# Patient Record
Sex: FEMALE | Race: WHITE | NOT HISPANIC OR LATINO | Employment: FULL TIME | URBAN - METROPOLITAN AREA
[De-identification: names, ages, dates, MRNs, and addresses within clinical notes are randomized per-mention and may not be internally consistent; named-entity substitution may affect disease eponyms.]

---

## 2017-07-26 ENCOUNTER — PRIOR ORIGINAL RECORDS (OUTPATIENT)
Dept: HEALTH INFORMATION MANAGEMENT | Age: 47
End: 2017-07-26

## 2017-10-02 ENCOUNTER — OFF PREMISE (OUTPATIENT)
Dept: OTHER | Age: 47
End: 2017-10-02

## 2017-10-17 ENCOUNTER — LAB SERVICES (OUTPATIENT)
Dept: LAB | Age: 47
End: 2017-10-17

## 2017-10-17 ENCOUNTER — OFFICE VISIT (OUTPATIENT)
Dept: OBGYN | Age: 47
End: 2017-10-17

## 2017-10-17 VITALS
SYSTOLIC BLOOD PRESSURE: 141 MMHG | DIASTOLIC BLOOD PRESSURE: 87 MMHG | BODY MASS INDEX: 30.24 KG/M2 | HEART RATE: 73 BPM | HEIGHT: 62 IN | WEIGHT: 164.35 LBS

## 2017-10-17 DIAGNOSIS — Z01.419 ENCOUNTER FOR GYNECOLOGICAL EXAMINATION WITHOUT ABNORMAL FINDING: Primary | ICD-10-CM

## 2017-10-17 DIAGNOSIS — Z01.419 ENCOUNTER FOR GYNECOLOGICAL EXAMINATION WITHOUT ABNORMAL FINDING: ICD-10-CM

## 2017-10-17 LAB
CHOLEST SERPL-MCNC: 206 MG/DL
CHOLEST/HDLC SERPL: 6.6 {RATIO}
FASTING STATUS PATIENT QL REPORTED: 15 HRS
GLUCOSE SERPL-MCNC: 87 MG/DL (ref 65–99)
HDLC SERPL-MCNC: 31 MG/DL
LDLC SERPL-MCNC: 139 MG/DL
LENGTH OF FAST TIME PATIENT: 15 HRS
NONHDLC SERPL-MCNC: 175 MG/DL
TRIGL SERPL-MCNC: 181 MG/DL

## 2017-10-17 PROCEDURE — 87624 HPV HI-RISK TYP POOLED RSLT: CPT | Performed by: INTERNAL MEDICINE

## 2017-10-17 PROCEDURE — 36415 COLL VENOUS BLD VENIPUNCTURE: CPT | Performed by: INTERNAL MEDICINE

## 2017-10-17 PROCEDURE — 80061 LIPID PANEL: CPT | Performed by: INTERNAL MEDICINE

## 2017-10-17 PROCEDURE — 99396 PREV VISIT EST AGE 40-64: CPT | Performed by: OBSTETRICS & GYNECOLOGY

## 2017-10-17 PROCEDURE — 82947 ASSAY GLUCOSE BLOOD QUANT: CPT | Performed by: INTERNAL MEDICINE

## 2017-10-17 PROCEDURE — 88175 CYTOPATH C/V AUTO FLUID REDO: CPT | Performed by: PATHOLOGY

## 2017-10-17 RX ORDER — LEVONORGESTREL AND ETHINYL ESTRADIOL 0.15-0.03
1 KIT ORAL DAILY
Qty: 91 TABLET | Refills: 4 | Status: SHIPPED | OUTPATIENT
Start: 2017-10-17 | End: 2018-10-26 | Stop reason: SDUPTHER

## 2017-10-20 LAB — HPV16+18+45 E6+E7MRNA CVX NAA+PROBE: NORMAL

## 2017-10-23 ENCOUNTER — TELEPHONE (OUTPATIENT)
Dept: OBGYN | Age: 47
End: 2017-10-23

## 2018-01-01 ENCOUNTER — EXTERNAL RECORD (OUTPATIENT)
Dept: OTHER | Age: 48
End: 2018-01-01

## 2018-10-01 ENCOUNTER — TELEPHONE (OUTPATIENT)
Dept: OBGYN | Age: 48
End: 2018-10-01

## 2018-10-26 RX ORDER — LEVONORGESTREL AND ETHINYL ESTRADIOL 0.15-0.03
KIT ORAL
Qty: 91 TABLET | Refills: 0 | Status: SHIPPED | OUTPATIENT
Start: 2018-10-26 | End: 2018-11-09 | Stop reason: SDUPTHER

## 2018-11-09 ENCOUNTER — LAB SERVICES (OUTPATIENT)
Dept: LAB | Age: 48
End: 2018-11-09

## 2018-11-09 ENCOUNTER — OFFICE VISIT (OUTPATIENT)
Dept: OBGYN | Age: 48
End: 2018-11-09

## 2018-11-09 VITALS
DIASTOLIC BLOOD PRESSURE: 86 MMHG | HEIGHT: 61 IN | BODY MASS INDEX: 31.47 KG/M2 | SYSTOLIC BLOOD PRESSURE: 138 MMHG | WEIGHT: 166.67 LBS | HEART RATE: 82 BPM

## 2018-11-09 DIAGNOSIS — Z11.3 SCREEN FOR STD (SEXUALLY TRANSMITTED DISEASE): ICD-10-CM

## 2018-11-09 DIAGNOSIS — Z01.419 WELL WOMAN EXAM WITH ROUTINE GYNECOLOGICAL EXAM: ICD-10-CM

## 2018-11-09 DIAGNOSIS — Z01.419 WELL WOMAN EXAM WITH ROUTINE GYNECOLOGICAL EXAM: Primary | ICD-10-CM

## 2018-11-09 PROCEDURE — 82947 ASSAY GLUCOSE BLOOD QUANT: CPT | Performed by: INTERNAL MEDICINE

## 2018-11-09 PROCEDURE — 87591 N.GONORRHOEAE DNA AMP PROB: CPT | Performed by: INTERNAL MEDICINE

## 2018-11-09 PROCEDURE — 99396 PREV VISIT EST AGE 40-64: CPT | Performed by: OBSTETRICS & GYNECOLOGY

## 2018-11-09 PROCEDURE — 80061 LIPID PANEL: CPT | Performed by: INTERNAL MEDICINE

## 2018-11-09 PROCEDURE — 87491 CHLMYD TRACH DNA AMP PROBE: CPT | Performed by: INTERNAL MEDICINE

## 2018-11-09 PROCEDURE — 87389 HIV-1 AG W/HIV-1&-2 AB AG IA: CPT | Performed by: INTERNAL MEDICINE

## 2018-11-09 PROCEDURE — 86780 TREPONEMA PALLIDUM: CPT | Performed by: INTERNAL MEDICINE

## 2018-11-09 PROCEDURE — 36415 COLL VENOUS BLD VENIPUNCTURE: CPT | Performed by: INTERNAL MEDICINE

## 2018-11-09 PROCEDURE — 87340 HEPATITIS B SURFACE AG IA: CPT | Performed by: INTERNAL MEDICINE

## 2018-11-09 PROCEDURE — 86803 HEPATITIS C AB TEST: CPT | Performed by: INTERNAL MEDICINE

## 2018-11-09 RX ORDER — LEVONORGESTREL AND ETHINYL ESTRADIOL 0.15-0.03
1 KIT ORAL DAILY
Qty: 91 TABLET | Refills: 4 | Status: SHIPPED | OUTPATIENT
Start: 2018-11-09 | End: 2020-11-23 | Stop reason: ALTCHOICE

## 2018-11-10 LAB
CHOLEST SERPL-MCNC: 200 MG/DL
CHOLEST/HDLC SERPL: 4.9 {RATIO}
FASTING STATUS PATIENT QL REPORTED: 13 HRS
GLUCOSE SERPL-MCNC: 85 MG/DL (ref 65–99)
HBV SURFACE AG SER QL: NEGATIVE
HCV AB SER QL: NEGATIVE
HDLC SERPL-MCNC: 41 MG/DL
HIV 1+2 AB+HIV1 P24 AG SERPL QL IA: NONREACTIVE
LDLC SERPL-MCNC: 125 MG/DL
LENGTH OF FAST TIME PATIENT: 13 HRS
NONHDLC SERPL-MCNC: 159 MG/DL
T PALLIDUM IGG SER QL: NONREACTIVE
TRIGL SERPL-MCNC: 169 MG/DL

## 2018-11-12 LAB
C TRACH RRNA SPEC QL NAA+PROBE: NEGATIVE
N GONORRHOEA RRNA SPEC QL NAA+PROBE: NEGATIVE
SPECIMEN SOURCE: NORMAL

## 2018-11-13 ENCOUNTER — TELEPHONE (OUTPATIENT)
Dept: OBGYN | Age: 48
End: 2018-11-13

## 2019-01-01 ENCOUNTER — EXTERNAL RECORD (OUTPATIENT)
Dept: OTHER | Age: 49
End: 2019-01-01

## 2019-07-12 RX ORDER — LEVONORGESTREL AND ETHINYL ESTRADIOL 0.15-0.03
KIT ORAL
Qty: 91 TABLET | Refills: 3 | Status: SHIPPED | OUTPATIENT
Start: 2019-07-12 | End: 2019-11-21 | Stop reason: SDUPTHER

## 2019-11-21 ENCOUNTER — OFFICE VISIT (OUTPATIENT)
Dept: OBGYN | Age: 49
End: 2019-11-21

## 2019-11-21 ENCOUNTER — LAB SERVICES (OUTPATIENT)
Dept: LAB | Age: 49
End: 2019-11-21

## 2019-11-21 VITALS
HEIGHT: 62 IN | HEART RATE: 81 BPM | SYSTOLIC BLOOD PRESSURE: 136 MMHG | DIASTOLIC BLOOD PRESSURE: 88 MMHG | BODY MASS INDEX: 31.56 KG/M2 | WEIGHT: 171.5 LBS

## 2019-11-21 DIAGNOSIS — Z11.3 SCREEN FOR STD (SEXUALLY TRANSMITTED DISEASE): ICD-10-CM

## 2019-11-21 DIAGNOSIS — N95.1 PERIMENOPAUSAL: ICD-10-CM

## 2019-11-21 DIAGNOSIS — Z01.419 WELL WOMAN EXAM WITH ROUTINE GYNECOLOGICAL EXAM: Primary | ICD-10-CM

## 2019-11-21 LAB — FSH SERPL-ACNC: 0.9 MUNITS/ML

## 2019-11-21 PROCEDURE — 87491 CHLMYD TRACH DNA AMP PROBE: CPT | Performed by: INTERNAL MEDICINE

## 2019-11-21 PROCEDURE — 87340 HEPATITIS B SURFACE AG IA: CPT | Performed by: INTERNAL MEDICINE

## 2019-11-21 PROCEDURE — 83001 ASSAY OF GONADOTROPIN (FSH): CPT | Performed by: INTERNAL MEDICINE

## 2019-11-21 PROCEDURE — 87389 HIV-1 AG W/HIV-1&-2 AB AG IA: CPT | Performed by: INTERNAL MEDICINE

## 2019-11-21 PROCEDURE — 99396 PREV VISIT EST AGE 40-64: CPT | Performed by: OBSTETRICS & GYNECOLOGY

## 2019-11-21 PROCEDURE — 86592 SYPHILIS TEST NON-TREP QUAL: CPT | Performed by: INTERNAL MEDICINE

## 2019-11-21 PROCEDURE — 36415 COLL VENOUS BLD VENIPUNCTURE: CPT | Performed by: INTERNAL MEDICINE

## 2019-11-21 PROCEDURE — 86803 HEPATITIS C AB TEST: CPT | Performed by: INTERNAL MEDICINE

## 2019-11-21 PROCEDURE — 87591 N.GONORRHOEAE DNA AMP PROB: CPT | Performed by: INTERNAL MEDICINE

## 2019-11-21 RX ORDER — LEVONORGESTREL AND ETHINYL ESTRADIOL 0.15-0.03
1 KIT ORAL DAILY
Qty: 91 TABLET | Refills: 4 | Status: SHIPPED | OUTPATIENT
Start: 2019-11-21 | End: 2020-11-23 | Stop reason: ALTCHOICE

## 2019-11-22 ENCOUNTER — TELEPHONE (OUTPATIENT)
Dept: OBGYN | Age: 49
End: 2019-11-22

## 2019-11-22 LAB
C TRACH RRNA SPEC QL NAA+PROBE: NEGATIVE
HBV SURFACE AG SER QL: NEGATIVE
HCV AB SER QL: NEGATIVE
HIV 1+2 AB+HIV1 P24 AG SERPL QL IA: NONREACTIVE
N GONORRHOEA RRNA SPEC QL NAA+PROBE: NEGATIVE
RPR SER QL: NONREACTIVE
SPECIMEN SOURCE: NORMAL

## 2019-12-19 ENCOUNTER — HOSPITAL ENCOUNTER (EMERGENCY)
Age: 49
Discharge: HOME | End: 2019-12-19
Attending: EMERGENCY MEDICINE

## 2019-12-19 VITALS
WEIGHT: 172.8 LBS | TEMPERATURE: 98.9 F | OXYGEN SATURATION: 100 % | DIASTOLIC BLOOD PRESSURE: 96 MMHG | SYSTOLIC BLOOD PRESSURE: 172 MMHG | BODY MASS INDEX: 31.8 KG/M2 | RESPIRATION RATE: 16 BRPM | HEIGHT: 62 IN | HEART RATE: 83 BPM

## 2019-12-19 DIAGNOSIS — B02.8 HERPES ZOSTER WITH COMPLICATION: Primary | ICD-10-CM

## 2019-12-19 PROCEDURE — 99283 EMERGENCY DEPT VISIT LOW MDM: CPT

## 2019-12-19 RX ORDER — VALACYCLOVIR HYDROCHLORIDE 1 G/1
1000 TABLET, FILM COATED ORAL 3 TIMES DAILY
Qty: 21 TABLET | Refills: 0 | Status: SHIPPED | OUTPATIENT
Start: 2019-12-19 | End: 2020-11-23 | Stop reason: ALTCHOICE

## 2019-12-19 ASSESSMENT — ENCOUNTER SYMPTOMS
VOMITING: 0
CONFUSION: 0
WEAKNESS: 0
NAUSEA: 0
FEVER: 0
COUGH: 0
SORE THROAT: 0
EYE PAIN: 0
COLOR CHANGE: 0
DIARRHEA: 0
PHOTOPHOBIA: 0
FATIGUE: 0
SHORTNESS OF BREATH: 0
ABDOMINAL PAIN: 0
BACK PAIN: 0
CHILLS: 0
HEADACHES: 0
NUMBNESS: 0
APPETITE CHANGE: 0

## 2019-12-19 ASSESSMENT — PAIN DESCRIPTION - PAIN TYPE: TYPE: ACUTE PAIN

## 2019-12-19 ASSESSMENT — PAIN SCALES - GENERAL: PAINLEVEL_OUTOF10: 2

## 2020-01-01 ENCOUNTER — EXTERNAL RECORD (OUTPATIENT)
Dept: OTHER | Age: 50
End: 2020-01-01

## 2020-02-17 ENCOUNTER — TELEPHONE (OUTPATIENT)
Dept: OBGYN | Age: 50
End: 2020-02-17

## 2020-05-07 ENCOUNTER — LAB SERVICES (OUTPATIENT)
Dept: LAB | Age: 50
End: 2020-05-07

## 2020-05-07 ENCOUNTER — TELEPHONE (OUTPATIENT)
Dept: OBGYN | Age: 50
End: 2020-05-07

## 2020-05-07 DIAGNOSIS — N95.1 PERIMENOPAUSAL: ICD-10-CM

## 2020-05-07 LAB — FSH SERPL-ACNC: 120.8 MUNITS/ML

## 2020-05-07 PROCEDURE — 83001 ASSAY OF GONADOTROPIN (FSH): CPT | Performed by: INTERNAL MEDICINE

## 2020-05-08 ENCOUNTER — TELEPHONE (OUTPATIENT)
Dept: OBGYN | Age: 50
End: 2020-05-08

## 2020-10-29 ENCOUNTER — TELEPHONE (OUTPATIENT)
Dept: OBGYN | Age: 50
End: 2020-10-29

## 2020-11-23 ENCOUNTER — OFFICE VISIT (OUTPATIENT)
Dept: OBGYN | Age: 50
End: 2020-11-23

## 2020-11-23 VITALS
BODY MASS INDEX: 28.41 KG/M2 | HEIGHT: 62 IN | SYSTOLIC BLOOD PRESSURE: 138 MMHG | DIASTOLIC BLOOD PRESSURE: 82 MMHG | WEIGHT: 154.4 LBS

## 2020-11-23 DIAGNOSIS — Z01.419 WELL WOMAN EXAM WITH ROUTINE GYNECOLOGICAL EXAM: Primary | ICD-10-CM

## 2020-11-23 DIAGNOSIS — Z11.3 SCREEN FOR STD (SEXUALLY TRANSMITTED DISEASE): ICD-10-CM

## 2020-11-23 PROBLEM — N89.3 VAGINAL INTRAEPITHELIAL NEOPLASIA: Status: ACTIVE | Noted: 2019-04-17

## 2020-11-23 PROBLEM — R87.612 LGSIL ON PAP SMEAR OF CERVIX: Status: ACTIVE | Noted: 2019-04-17

## 2020-11-23 PROCEDURE — 99396 PREV VISIT EST AGE 40-64: CPT | Performed by: OBSTETRICS & GYNECOLOGY

## 2020-11-23 PROCEDURE — 87491 CHLMYD TRACH DNA AMP PROBE: CPT | Performed by: INTERNAL MEDICINE

## 2020-11-23 PROCEDURE — 87624 HPV HI-RISK TYP POOLED RSLT: CPT | Performed by: INTERNAL MEDICINE

## 2020-11-23 PROCEDURE — 87591 N.GONORRHOEAE DNA AMP PROB: CPT | Performed by: INTERNAL MEDICINE

## 2020-11-23 PROCEDURE — 88175 CYTOPATH C/V AUTO FLUID REDO: CPT | Performed by: PATHOLOGY

## 2020-11-23 PROCEDURE — 87661 TRICHOMONAS VAGINALIS AMPLIF: CPT | Performed by: INTERNAL MEDICINE

## 2020-11-24 LAB
C TRACH RRNA CVX QL NAA+PROBE: NEGATIVE
Lab: NORMAL
N GONORRHOEA RRNA CVX QL NAA+PROBE: NEGATIVE
T VAGINALIS RRNA CVX QL NAA+PROBE: NEGATIVE

## 2020-11-30 ENCOUNTER — TELEPHONE (OUTPATIENT)
Dept: OBGYN | Age: 50
End: 2020-11-30

## 2020-11-30 LAB
CASE RPRT: NORMAL
CYTOLOGY CVX/VAG STUDY: NORMAL
HPV16+18+45 E6+E7MRNA CVX NAA+PROBE: NEGATIVE
Lab: NORMAL
PAP EDUCATIONAL NOTE: NORMAL
SPECIMEN ADEQUACY: NORMAL

## 2021-09-05 ENCOUNTER — WALK IN (OUTPATIENT)
Dept: URGENT CARE | Age: 51
End: 2021-09-05

## 2021-09-05 VITALS
BODY MASS INDEX: 27.05 KG/M2 | TEMPERATURE: 97.2 F | DIASTOLIC BLOOD PRESSURE: 84 MMHG | OXYGEN SATURATION: 100 % | WEIGHT: 147 LBS | SYSTOLIC BLOOD PRESSURE: 134 MMHG | HEIGHT: 62 IN | RESPIRATION RATE: 12 BRPM | HEART RATE: 68 BPM

## 2021-09-05 DIAGNOSIS — R39.9 GU (GENITOURINARY) SYMPTOMS: Primary | ICD-10-CM

## 2021-09-05 LAB
APPEARANCE, POC: CLEAR
BILIRUBIN, POC: NEGATIVE
COLOR, POC: YELLOW
GLUCOSE UR-MCNC: NEGATIVE MG/DL
KETONES, POC: NEGATIVE MG/DL
NITRITE, POC: POSITIVE
OCCULT BLOOD, POC: ABNORMAL
PH UR: 6.5 [PH] (ref 5–7)
PROT UR-MCNC: NEGATIVE MG/DL
SP GR UR: 1.01 (ref 1–1.03)
UROBILINOGEN UR-MCNC: 0.2 MG/DL (ref 0–1)
WBC (LEUKOCYTE) ESTERASE, POC: ABNORMAL

## 2021-09-05 PROCEDURE — 87086 URINE CULTURE/COLONY COUNT: CPT | Performed by: INTERNAL MEDICINE

## 2021-09-05 PROCEDURE — 81003 URINALYSIS AUTO W/O SCOPE: CPT | Performed by: NURSE PRACTITIONER

## 2021-09-05 PROCEDURE — 99204 OFFICE O/P NEW MOD 45 MIN: CPT | Performed by: NURSE PRACTITIONER

## 2021-09-05 RX ORDER — CIPROFLOXACIN 500 MG/1
500 TABLET, FILM COATED ORAL 2 TIMES DAILY
Qty: 20 TABLET | Refills: 0 | Status: SHIPPED | OUTPATIENT
Start: 2021-09-05 | End: 2021-09-15

## 2021-09-05 RX ORDER — SULFAMETHOXAZOLE AND TRIMETHOPRIM 800; 160 MG/1; MG/1
1 TABLET ORAL 2 TIMES DAILY
Qty: 20 TABLET | Refills: 0 | Status: SHIPPED | OUTPATIENT
Start: 2021-09-05

## 2021-09-05 ASSESSMENT — ENCOUNTER SYMPTOMS
VOMITING: 0
ABDOMINAL PAIN: 0
FEVER: 0
BACK PAIN: 0
WEAKNESS: 0
NAUSEA: 0
CHILLS: 1

## 2021-09-06 LAB — BACTERIA UR CULT: NORMAL

## 2021-09-07 ENCOUNTER — TELEPHONE (OUTPATIENT)
Dept: URGENT CARE | Age: 51
End: 2021-09-07

## 2021-11-23 ENCOUNTER — OFFICE VISIT (OUTPATIENT)
Dept: OBGYN | Age: 51
End: 2021-11-23

## 2021-11-23 VITALS
WEIGHT: 150 LBS | DIASTOLIC BLOOD PRESSURE: 82 MMHG | BODY MASS INDEX: 27.6 KG/M2 | HEIGHT: 62 IN | SYSTOLIC BLOOD PRESSURE: 118 MMHG

## 2021-11-23 DIAGNOSIS — Z01.419 WELL WOMAN EXAM WITH ROUTINE GYNECOLOGICAL EXAM: Primary | ICD-10-CM

## 2021-11-23 PROCEDURE — 99396 PREV VISIT EST AGE 40-64: CPT | Performed by: OBSTETRICS & GYNECOLOGY

## 2021-11-30 ENCOUNTER — EXTERNAL RECORD (OUTPATIENT)
Dept: OTHER | Age: 51
End: 2021-11-30

## 2023-03-14 ENCOUNTER — OFFICE VISIT (OUTPATIENT)
Dept: OBGYN | Age: 53
End: 2023-03-14

## 2023-03-14 VITALS
HEIGHT: 62 IN | DIASTOLIC BLOOD PRESSURE: 82 MMHG | BODY MASS INDEX: 25.17 KG/M2 | SYSTOLIC BLOOD PRESSURE: 116 MMHG | WEIGHT: 136.8 LBS

## 2023-03-14 DIAGNOSIS — Z01.419 WELL WOMAN EXAM WITH ROUTINE GYNECOLOGICAL EXAM: Primary | ICD-10-CM

## 2023-03-14 PROCEDURE — 99396 PREV VISIT EST AGE 40-64: CPT | Performed by: OBSTETRICS & GYNECOLOGY

## 2023-07-22 ENCOUNTER — APPOINTMENT (OUTPATIENT)
Dept: GENERAL RADIOLOGY | Facility: CLINIC | Age: 53
End: 2023-07-22
Attending: EMERGENCY MEDICINE
Payer: COMMERCIAL

## 2023-07-22 ENCOUNTER — ANESTHESIA (OUTPATIENT)
Dept: SURGERY | Facility: CLINIC | Age: 53
End: 2023-07-22
Payer: COMMERCIAL

## 2023-07-22 ENCOUNTER — ANESTHESIA EVENT (OUTPATIENT)
Dept: SURGERY | Facility: CLINIC | Age: 53
End: 2023-07-22
Payer: COMMERCIAL

## 2023-07-22 ENCOUNTER — HOSPITAL ENCOUNTER (OUTPATIENT)
Facility: CLINIC | Age: 53
Discharge: HOME | End: 2023-07-22
Attending: EMERGENCY MEDICINE | Admitting: EMERGENCY MEDICINE
Payer: COMMERCIAL

## 2023-07-22 ENCOUNTER — APPOINTMENT (OUTPATIENT)
Dept: CT IMAGING | Facility: CLINIC | Age: 53
End: 2023-07-22
Attending: EMERGENCY MEDICINE
Payer: COMMERCIAL

## 2023-07-22 VITALS
SYSTOLIC BLOOD PRESSURE: 132 MMHG | RESPIRATION RATE: 16 BRPM | HEART RATE: 91 BPM | OXYGEN SATURATION: 99 % | HEIGHT: 62 IN | BODY MASS INDEX: 24.84 KG/M2 | TEMPERATURE: 97.5 F | DIASTOLIC BLOOD PRESSURE: 81 MMHG | WEIGHT: 135 LBS

## 2023-07-22 DIAGNOSIS — R93.5 ABNORMAL CT OF THE ABDOMEN: ICD-10-CM

## 2023-07-22 DIAGNOSIS — K56.609 SMALL BOWEL OBSTRUCTION (H): ICD-10-CM

## 2023-07-22 LAB
ANION GAP SERPL CALCULATED.3IONS-SCNC: 17 MMOL/L (ref 7–15)
BUN SERPL-MCNC: 13.7 MG/DL (ref 6–20)
CALCIUM SERPL-MCNC: 10.3 MG/DL (ref 8.6–10)
CHLORIDE SERPL-SCNC: 102 MMOL/L (ref 98–107)
CREAT SERPL-MCNC: 0.76 MG/DL (ref 0.51–0.95)
DEPRECATED HCO3 PLAS-SCNC: 21 MMOL/L (ref 22–29)
ERYTHROCYTE [DISTWIDTH] IN BLOOD BY AUTOMATED COUNT: 12.3 % (ref 10–15)
GFR SERPL CREATININE-BSD FRML MDRD: >90 ML/MIN/1.73M2
GLUCOSE SERPL-MCNC: 119 MG/DL (ref 70–99)
HCT VFR BLD AUTO: 45.9 % (ref 35–47)
HGB BLD-MCNC: 15.8 G/DL (ref 11.7–15.7)
HOLD SPECIMEN: NORMAL
HOLD SPECIMEN: NORMAL
LACTATE SERPL-SCNC: 1 MMOL/L (ref 0.7–2)
LIPASE SERPL-CCNC: 17 U/L (ref 13–60)
MCH RBC QN AUTO: 30.3 PG (ref 26.5–33)
MCHC RBC AUTO-ENTMCNC: 34.4 G/DL (ref 31.5–36.5)
MCV RBC AUTO: 88 FL (ref 78–100)
PLATELET # BLD AUTO: 236 10E3/UL (ref 150–450)
POTASSIUM SERPL-SCNC: 3.6 MMOL/L (ref 3.4–5.3)
RADIOLOGIST FLAGS: ABNORMAL
RBC # BLD AUTO: 5.21 10E6/UL (ref 3.8–5.2)
SODIUM SERPL-SCNC: 140 MMOL/L (ref 136–145)
TROPONIN T SERPL HS-MCNC: <6 NG/L
WBC # BLD AUTO: 14.2 10E3/UL (ref 4–11)

## 2023-07-22 PROCEDURE — 83690 ASSAY OF LIPASE: CPT | Performed by: EMERGENCY MEDICINE

## 2023-07-22 PROCEDURE — 83605 ASSAY OF LACTIC ACID: CPT | Performed by: EMERGENCY MEDICINE

## 2023-07-22 PROCEDURE — 999N000065 XR ABDOMEN PORT 1 VIEW

## 2023-07-22 PROCEDURE — 250N000011 HC RX IP 250 OP 636: Mod: JZ

## 2023-07-22 PROCEDURE — 36415 COLL VENOUS BLD VENIPUNCTURE: CPT | Performed by: EMERGENCY MEDICINE

## 2023-07-22 PROCEDURE — 250N000009 HC RX 250: Performed by: EMERGENCY MEDICINE

## 2023-07-22 PROCEDURE — 250N000011 HC RX IP 250 OP 636: Performed by: SURGERY

## 2023-07-22 PROCEDURE — 99221 1ST HOSP IP/OBS SF/LOW 40: CPT | Mod: 57 | Performed by: SURGERY

## 2023-07-22 PROCEDURE — 258N000003 HC RX IP 258 OP 636: Performed by: EMERGENCY MEDICINE

## 2023-07-22 PROCEDURE — 93005 ELECTROCARDIOGRAM TRACING: CPT | Performed by: EMERGENCY MEDICINE

## 2023-07-22 PROCEDURE — 58662 LAPAROSCOPY EXCISE LESIONS: CPT | Performed by: SURGERY

## 2023-07-22 PROCEDURE — 250N000011 HC RX IP 250 OP 636: Performed by: EMERGENCY MEDICINE

## 2023-07-22 PROCEDURE — 250N000009 HC RX 250: Performed by: SURGERY

## 2023-07-22 PROCEDURE — 99291 CRITICAL CARE FIRST HOUR: CPT | Mod: 25 | Performed by: EMERGENCY MEDICINE

## 2023-07-22 PROCEDURE — 710N000009 HC RECOVERY PHASE 1, LEVEL 1, PER MIN: Performed by: SURGERY

## 2023-07-22 PROCEDURE — 80048 BASIC METABOLIC PNL TOTAL CA: CPT | Performed by: EMERGENCY MEDICINE

## 2023-07-22 PROCEDURE — 250N000011 HC RX IP 250 OP 636

## 2023-07-22 PROCEDURE — 74177 CT ABD & PELVIS W/CONTRAST: CPT

## 2023-07-22 PROCEDURE — 271N000001 HC OR GENERAL SUPPLY NON-STERILE: Performed by: SURGERY

## 2023-07-22 PROCEDURE — 710N000012 HC RECOVERY PHASE 2, PER MINUTE: Performed by: SURGERY

## 2023-07-22 PROCEDURE — 93010 ELECTROCARDIOGRAM REPORT: CPT | Performed by: EMERGENCY MEDICINE

## 2023-07-22 PROCEDURE — 360N000076 HC SURGERY LEVEL 3, PER MIN: Performed by: SURGERY

## 2023-07-22 PROCEDURE — 250N000011 HC RX IP 250 OP 636: Mod: JZ | Performed by: EMERGENCY MEDICINE

## 2023-07-22 PROCEDURE — 84484 ASSAY OF TROPONIN QUANT: CPT | Performed by: EMERGENCY MEDICINE

## 2023-07-22 PROCEDURE — 96375 TX/PRO/DX INJ NEW DRUG ADDON: CPT | Performed by: EMERGENCY MEDICINE

## 2023-07-22 PROCEDURE — 250N000025 HC SEVOFLURANE, PER MIN: Performed by: SURGERY

## 2023-07-22 PROCEDURE — 370N000017 HC ANESTHESIA TECHNICAL FEE, PER MIN: Performed by: SURGERY

## 2023-07-22 PROCEDURE — 85027 COMPLETE CBC AUTOMATED: CPT | Performed by: EMERGENCY MEDICINE

## 2023-07-22 PROCEDURE — 272N000001 HC OR GENERAL SUPPLY STERILE: Performed by: SURGERY

## 2023-07-22 PROCEDURE — 250N000009 HC RX 250

## 2023-07-22 PROCEDURE — 96374 THER/PROPH/DIAG INJ IV PUSH: CPT | Mod: 59 | Performed by: EMERGENCY MEDICINE

## 2023-07-22 PROCEDURE — 258N000003 HC RX IP 258 OP 636

## 2023-07-22 RX ORDER — LIDOCAINE HYDROCHLORIDE AND EPINEPHRINE 10; 10 MG/ML; UG/ML
INJECTION, SOLUTION INFILTRATION; PERINEURAL PRN
Status: DISCONTINUED | OUTPATIENT
Start: 2023-07-22 | End: 2023-07-22 | Stop reason: HOSPADM

## 2023-07-22 RX ORDER — ONDANSETRON 2 MG/ML
INJECTION INTRAMUSCULAR; INTRAVENOUS PRN
Status: DISCONTINUED | OUTPATIENT
Start: 2023-07-22 | End: 2023-07-22

## 2023-07-22 RX ORDER — DEXAMETHASONE SODIUM PHOSPHATE 4 MG/ML
INJECTION, SOLUTION INTRA-ARTICULAR; INTRALESIONAL; INTRAMUSCULAR; INTRAVENOUS; SOFT TISSUE PRN
Status: DISCONTINUED | OUTPATIENT
Start: 2023-07-22 | End: 2023-07-22

## 2023-07-22 RX ORDER — BUPIVACAINE HYDROCHLORIDE 5 MG/ML
INJECTION, SOLUTION PERINEURAL PRN
Status: DISCONTINUED | OUTPATIENT
Start: 2023-07-22 | End: 2023-07-22 | Stop reason: HOSPADM

## 2023-07-22 RX ORDER — SODIUM CHLORIDE, SODIUM LACTATE, POTASSIUM CHLORIDE, CALCIUM CHLORIDE 600; 310; 30; 20 MG/100ML; MG/100ML; MG/100ML; MG/100ML
INJECTION, SOLUTION INTRAVENOUS CONTINUOUS
Status: DISCONTINUED | OUTPATIENT
Start: 2023-07-22 | End: 2023-07-22 | Stop reason: HOSPADM

## 2023-07-22 RX ORDER — LIDOCAINE HYDROCHLORIDE 20 MG/ML
INJECTION, SOLUTION INFILTRATION; PERINEURAL PRN
Status: DISCONTINUED | OUTPATIENT
Start: 2023-07-22 | End: 2023-07-22

## 2023-07-22 RX ORDER — HYDROMORPHONE HCL IN WATER/PF 6 MG/30 ML
0.4 PATIENT CONTROLLED ANALGESIA SYRINGE INTRAVENOUS EVERY 5 MIN PRN
Status: DISCONTINUED | OUTPATIENT
Start: 2023-07-22 | End: 2023-07-22 | Stop reason: HOSPADM

## 2023-07-22 RX ORDER — DOCUSATE SODIUM 100 MG/1
100 CAPSULE, LIQUID FILLED ORAL 2 TIMES DAILY
Refills: 0 | COMMUNITY
Start: 2023-07-22

## 2023-07-22 RX ORDER — PROPOFOL 10 MG/ML
INJECTION, EMULSION INTRAVENOUS PRN
Status: DISCONTINUED | OUTPATIENT
Start: 2023-07-22 | End: 2023-07-22

## 2023-07-22 RX ORDER — OXYCODONE HYDROCHLORIDE 5 MG/1
5 TABLET ORAL
Status: DISCONTINUED | OUTPATIENT
Start: 2023-07-22 | End: 2023-07-31 | Stop reason: HOSPADM

## 2023-07-22 RX ORDER — SODIUM CHLORIDE 9 MG/ML
1000 INJECTION, SOLUTION INTRAVENOUS CONTINUOUS
Status: DISCONTINUED | OUTPATIENT
Start: 2023-07-22 | End: 2023-07-31 | Stop reason: HOSPADM

## 2023-07-22 RX ORDER — ONDANSETRON 2 MG/ML
4 INJECTION INTRAMUSCULAR; INTRAVENOUS ONCE
Status: COMPLETED | OUTPATIENT
Start: 2023-07-22 | End: 2023-07-22

## 2023-07-22 RX ORDER — FENTANYL CITRATE 50 UG/ML
INJECTION, SOLUTION INTRAMUSCULAR; INTRAVENOUS PRN
Status: DISCONTINUED | OUTPATIENT
Start: 2023-07-22 | End: 2023-07-22

## 2023-07-22 RX ORDER — KETOROLAC TROMETHAMINE 30 MG/ML
INJECTION, SOLUTION INTRAMUSCULAR; INTRAVENOUS PRN
Status: DISCONTINUED | OUTPATIENT
Start: 2023-07-22 | End: 2023-07-22

## 2023-07-22 RX ORDER — ONDANSETRON 4 MG/1
4 TABLET, ORALLY DISINTEGRATING ORAL EVERY 30 MIN PRN
Status: DISCONTINUED | OUTPATIENT
Start: 2023-07-22 | End: 2023-07-22 | Stop reason: HOSPADM

## 2023-07-22 RX ORDER — CLINDAMYCIN PHOSPHATE 900 MG/50ML
900 INJECTION, SOLUTION INTRAVENOUS SEE ADMIN INSTRUCTIONS
Status: CANCELLED | OUTPATIENT
Start: 2023-07-22

## 2023-07-22 RX ORDER — CLINDAMYCIN PHOSPHATE 900 MG/50ML
900 INJECTION, SOLUTION INTRAVENOUS
Status: CANCELLED | OUTPATIENT
Start: 2023-07-22

## 2023-07-22 RX ORDER — ONDANSETRON 2 MG/ML
4 INJECTION INTRAMUSCULAR; INTRAVENOUS EVERY 30 MIN PRN
Status: DISCONTINUED | OUTPATIENT
Start: 2023-07-22 | End: 2023-07-22 | Stop reason: HOSPADM

## 2023-07-22 RX ORDER — IOPAMIDOL 755 MG/ML
60 INJECTION, SOLUTION INTRAVASCULAR ONCE
Status: COMPLETED | OUTPATIENT
Start: 2023-07-22 | End: 2023-07-22

## 2023-07-22 RX ORDER — FENTANYL CITRATE 50 UG/ML
25 INJECTION, SOLUTION INTRAMUSCULAR; INTRAVENOUS EVERY 5 MIN PRN
Status: DISCONTINUED | OUTPATIENT
Start: 2023-07-22 | End: 2023-07-22 | Stop reason: HOSPADM

## 2023-07-22 RX ORDER — HEPARIN SODIUM 5000 [USP'U]/.5ML
5000 INJECTION, SOLUTION INTRAVENOUS; SUBCUTANEOUS
Status: CANCELLED | OUTPATIENT
Start: 2023-07-22

## 2023-07-22 RX ORDER — HYDROMORPHONE HYDROCHLORIDE 1 MG/ML
0.5 INJECTION, SOLUTION INTRAMUSCULAR; INTRAVENOUS; SUBCUTANEOUS EVERY 30 MIN PRN
Status: DISCONTINUED | OUTPATIENT
Start: 2023-07-22 | End: 2023-07-31 | Stop reason: HOSPADM

## 2023-07-22 RX ORDER — FENTANYL CITRATE 50 UG/ML
50 INJECTION, SOLUTION INTRAMUSCULAR; INTRAVENOUS EVERY 5 MIN PRN
Status: DISCONTINUED | OUTPATIENT
Start: 2023-07-22 | End: 2023-07-22 | Stop reason: HOSPADM

## 2023-07-22 RX ORDER — OXYCODONE HYDROCHLORIDE 5 MG/1
5 TABLET ORAL EVERY 6 HOURS PRN
Qty: 12 TABLET | Refills: 0 | Status: SHIPPED | OUTPATIENT
Start: 2023-07-22 | End: 2023-07-25

## 2023-07-22 RX ORDER — HYDROMORPHONE HCL IN WATER/PF 6 MG/30 ML
0.2 PATIENT CONTROLLED ANALGESIA SYRINGE INTRAVENOUS EVERY 5 MIN PRN
Status: DISCONTINUED | OUTPATIENT
Start: 2023-07-22 | End: 2023-07-22 | Stop reason: HOSPADM

## 2023-07-22 RX ORDER — CEFAZOLIN SODIUM/WATER 2 G/20 ML
SYRINGE (ML) INTRAVENOUS PRN
Status: DISCONTINUED | OUTPATIENT
Start: 2023-07-22 | End: 2023-07-22

## 2023-07-22 RX ORDER — MEPERIDINE HYDROCHLORIDE 25 MG/ML
12.5 INJECTION INTRAMUSCULAR; INTRAVENOUS; SUBCUTANEOUS EVERY 5 MIN PRN
Status: DISCONTINUED | OUTPATIENT
Start: 2023-07-22 | End: 2023-07-22 | Stop reason: HOSPADM

## 2023-07-22 RX ADMIN — SODIUM CHLORIDE: 0.9 INJECTION, SOLUTION INTRAVENOUS at 11:29

## 2023-07-22 RX ADMIN — Medication 2 G: at 11:29

## 2023-07-22 RX ADMIN — FENTANYL CITRATE 50 MCG: 50 INJECTION, SOLUTION INTRAMUSCULAR; INTRAVENOUS at 11:34

## 2023-07-22 RX ADMIN — MEPERIDINE HYDROCHLORIDE 12.5 MG: 25 INJECTION INTRAMUSCULAR; INTRAVENOUS; SUBCUTANEOUS at 13:05

## 2023-07-22 RX ADMIN — HYDROMORPHONE HYDROCHLORIDE 0.5 MG: 1 INJECTION, SOLUTION INTRAMUSCULAR; INTRAVENOUS; SUBCUTANEOUS at 08:52

## 2023-07-22 RX ADMIN — MIDAZOLAM 2 MG: 1 INJECTION INTRAMUSCULAR; INTRAVENOUS at 11:30

## 2023-07-22 RX ADMIN — PHENYLEPHRINE HYDROCHLORIDE 100 MCG: 10 INJECTION INTRAVENOUS at 12:29

## 2023-07-22 RX ADMIN — ROCURONIUM BROMIDE 50 MG: 50 INJECTION, SOLUTION INTRAVENOUS at 11:32

## 2023-07-22 RX ADMIN — KETOROLAC TROMETHAMINE 30 MG: 30 INJECTION, SOLUTION INTRAMUSCULAR at 12:28

## 2023-07-22 RX ADMIN — ONDANSETRON 4 MG: 2 INJECTION INTRAMUSCULAR; INTRAVENOUS at 08:21

## 2023-07-22 RX ADMIN — LIDOCAINE HYDROCHLORIDE 50 MG: 20 INJECTION, SOLUTION INFILTRATION; PERINEURAL at 11:31

## 2023-07-22 RX ADMIN — Medication 100 MG: at 11:32

## 2023-07-22 RX ADMIN — DEXAMETHASONE SODIUM PHOSPHATE 8 MG: 4 INJECTION, SOLUTION INTRA-ARTICULAR; INTRALESIONAL; INTRAMUSCULAR; INTRAVENOUS; SOFT TISSUE at 12:27

## 2023-07-22 RX ADMIN — PHENYLEPHRINE HYDROCHLORIDE 100 MCG: 10 INJECTION INTRAVENOUS at 11:54

## 2023-07-22 RX ADMIN — SUGAMMADEX 200 MG: 100 INJECTION, SOLUTION INTRAVENOUS at 12:41

## 2023-07-22 RX ADMIN — PHENYLEPHRINE HYDROCHLORIDE 100 MCG: 10 INJECTION INTRAVENOUS at 12:06

## 2023-07-22 RX ADMIN — ONDANSETRON 4 MG: 2 INJECTION INTRAMUSCULAR; INTRAVENOUS at 12:27

## 2023-07-22 RX ADMIN — PROPOFOL 200 MG: 10 INJECTION, EMULSION INTRAVENOUS at 11:31

## 2023-07-22 RX ADMIN — IOPAMIDOL 60 ML: 755 INJECTION, SOLUTION INTRAVENOUS at 09:05

## 2023-07-22 RX ADMIN — FENTANYL CITRATE 50 MCG: 50 INJECTION, SOLUTION INTRAMUSCULAR; INTRAVENOUS at 11:54

## 2023-07-22 RX ADMIN — SODIUM CHLORIDE 1000 ML: 9 INJECTION, SOLUTION INTRAVENOUS at 08:50

## 2023-07-22 RX ADMIN — SODIUM CHLORIDE 56 ML: 9 INJECTION, SOLUTION INTRAVENOUS at 09:05

## 2023-07-22 RX ADMIN — FENTANYL CITRATE 50 MCG: 50 INJECTION, SOLUTION INTRAMUSCULAR; INTRAVENOUS at 11:31

## 2023-07-22 ASSESSMENT — ACTIVITIES OF DAILY LIVING (ADL)
ADLS_ACUITY_SCORE: 35

## 2023-07-22 ASSESSMENT — ENCOUNTER SYMPTOMS
NAUSEA: 1
PSYCHIATRIC NEGATIVE: 1
ENDOCRINE NEGATIVE: 1
ABDOMINAL PAIN: 1
HEMATOLOGIC/LYMPHATIC NEGATIVE: 1
EYES NEGATIVE: 1
ALLERGIC/IMMUNOLOGIC NEGATIVE: 1
NEUROLOGICAL NEGATIVE: 1
MUSCULOSKELETAL NEGATIVE: 1
CARDIOVASCULAR NEGATIVE: 1
RESPIRATORY NEGATIVE: 1
VOMITING: 1

## 2023-07-22 NOTE — DISCHARGE INSTRUCTIONS
HOME CARE FOLLOWING ABDOMINAL SURGERY    INCISIONAL CARE:  Replace the bandage over your incision (or incisions) until all drainage stops, or if more comfortable to have in place.  If present, leave the steri-strips (white paper tapes) in place for 14 days after surgery.  If you have staples in your incision at the time of discharge, they will be removed at your follow-up appointment.  If Dermabond (a type of skin glue) is present, leave in place until it wears/flakes off.     BATHING:  Avoid baths for 1 week after surgery.  Showers are okay.  You may wash your hair at any time.  Gently pat your incision dry after bathing.    ACTIVITY:  Light Activity -- you may immediately be up and about as tolerated.  Driving -- you may drive when comfortable and off narcotic pain medications (example: Norco, Percocet, Hydrocodone).  Light Work -- resume when comfortable off pain medications.  (If you can drive, you probably can work.)  Strenuous Work/Activity -- limit lifting to 20 pounds for 4 weeks.  Then, progressively increase with time.  Active Sports (running, biking, etc.) -- cautiously resume after 6 weeks.  Most importantly listen to your body.  If an activity causes pain or discomfort please stop and try in a few days or decrease your intensity.    DISCOMFORT:  Use pain medications as prescribed by your surgeon.  Take the pain medication with some food, when possible, to minimize side effects.  Expect gradual improvement.      Narcotic Pain Medications:  Do not drive, make important decisions or operate heavy machinery while on narcotic pain medications.  Narcotic pain medications can be associated with nausea, sleep disturbance, and constipation.  Unless directed otherwise, please take an over the counter stool softener (Colace 100mg twice a day) unless directed otherwise.  As soon as you are able to stop narcotic pain medications the better. Long term use of narcotics is associated with tolerance (the need for more  medication for effect) and potential addiction.    DIET:  Return to diet you were on before surgery, unless you are given specific diet instructions.  Drink plenty of fluids.  While taking pain medications, increase dietary fiber or add a fiber supplementation like Metamucil or Citrucel to help prevent constipation - a possible side effect of pain medications.    NAUSEA:  If nauseated from the anesthetic/pain meds; rest in bed, get up cautiously with assistance, and drink clear liquids (juice, tea, broth).    RETURN APPOINTMENT:  Schedule a follow-up visit 2-3 weeks after discharge from the hospital.  Office Phone: [phone]     CONTACT US IF THE FOLLOWING DEVELOPS:   1. A fever that is above 101     2. If there is a large amount of drainage, bleeding, or swelling.   3. Severe pain that is not relieved by your prescription.   4. Drainage that is thick, cloudy, yellow, green or white.   5. Any other questions not answered by  Frequently Asked Questions  sheet.      FREQUENTLY ASKED QUESTIONS:    Q:  How should my incision look?    A:  Normally your incision will appear slightly swollen with light redness directly along the incision itself as it heals.  It may feel like a bump or ridge as the healing/scarring happens, and over time (3-4 months) this bump or ridge feeling should slowly go away.  In general, clear or pink watery drainage can be normal at first as your incision heals, but should decrease over time.    Q:  How do I know if my incision is infected?  A:  Look at your incision for signs of infection, like redness around the incision spreading to surrounding skin, or drainage of cloudy or foul-smelling drainage.  If you feel warm, check your temperature to see if you are running a fever.    **If any of these things occur, please notify the nurse at our office.  We may need you to come into the office for an incision check.      Q:  How do I take care of my incision?  A:  If you have a dressing in place -  Starting the day after surgery, replace the dressing 1-2 times a day until there is no further drainage from the incision.  At that time, a dressing is no longer needed.  Try to minimize tape on the skin if irritation is occurring at the tape sites.  If you have significant irritation from tape on the skin, please call the office to discuss other method of dressing your incision.    Small pieces of tape called  steri-strips  may be present directly overlying your incision; these may be removed 10 days after surgery unless otherwise specified by your surgeon.  If these tapes start to loosen at the ends, you may trim them back until they fall off or are removed.    A:  If you had  Dermabond  tissue glue used as a dressing (this causes your incision to look shiny with a clear covering over it) - This type of dressing wears off with time and does not require more dressings over the top unless it is draining around the glue as it wears off.  Do not apply ointments or lotions over the incisions until the glue has completely worn off.    Q:  There is a piece of tape or a sticky  lead  still on my skin.  Can I remove this?  A:  Sometimes the sticky  leads  used for monitoring during surgery or for evaluation in the emergency department are not all removed while you are in the hospital.  These sometimes have a tab or metal dot on them.  You can easily remove these on your own, like taking off a band-aid.  If there is a gel substance under the  lead , simply wipe/clean it off with a washcloth or paper towel.      Q:  What can I do to minimize constipation (very hard stools, or lack of stools)?  A:  Stay well hydrated.  Increase your dietary fiber intake or take a fiber supplement -with plenty of water.  Walk around frequently.  You may consider an over-the-counter stool-softener.  Your Pharmacist can assist you with choosing one that is stocked at your pharmacy.  Constipation is also one of the most common side effects of  pain medication.  If you are using pain medication, be pro-active and try to PREVENT problems with constipation by taking the steps above BEFORE constipation becomes a problem.    Q:  What do I do if I need more pain medications?  A:  Call the office to receive refills.  Be aware that certain pain meds cannot be called into a pharmacy and actually require a paper prescription.  A change may be made in your pain med as you progress thru your recovery period or if you have side effects to certain meds.    --Pain meds are NOT refilled after 5pm on weekdays, and NOT AT ALL on the weekends, so please look ahead to prevent problems.      Q:  Why am I having a hard time sleeping now that I am at home?  A:  Many medications you receive while you are in the hospital can impact your sleep for a number of days after your surgery/hospitalization.  Decreased level of activity and naps during the day may also make sleeping at night difficult.  Try to minimize day-time naps, and get up frequently during the day to walk around your home during your recovery time.  Sleep aides may be of some help, but are not recommended for long-term use.      Q:  I am having some back discomfort.  What should I do?  A:  This may be related to certain positioning that was required for your surgery, extended periods of time in bed, or other changes in your overall activity level.  You may try ice, heat, acetaminophen, or ibuprofen to treat this temporarily.  Note that many pain medications have acetaminophen in them and would state this on the prescription bottle.  Be sure not to exceed the maximum of 4000mg per day of acetaminophen.     **If the pain you are having does not resolve, is severe, or is a flare of back pain you have had on other occasions prior to surgery, please contact your primary physician for further recommendations or for an appointment to be examined at their office.    Q:  Why am I having headaches?  A:  Headaches can be caused  by many things:  caffeine withdrawal, use of pain meds, dehydration, high blood pressure, lack of sleep, over-activity/exhaustion, flare-up of usual migraine headaches.  If you feel this is related to muscle tension (a band-like feeling around the head, or a pressure at the low-back of the head) you may try ice or heat to this area.  You may need to drink more fluids (try electrolyte drink like Gatorade), rest, or take your usual migraine medications.   **If your headaches do not resolve, worsen, are accompanied by other symptoms, or if your blood pressure is high, please call your primary physician for recommendation and/or examination.    Q:  I am unable to urinate.  What do I do?  A:  A small percentage of people can have difficulty urinating initially after surgery.  This includes being able to urinate only a very small amount at a time and feeling discomfort or pressure in the very low abdomen.  This is called  urinary retention , and is actually an urgent situation.  Proceed to your nearest Emergency department for evaluation (not an Urgent Care Center).  Sometimes the bladder does not work correctly after certain medications you receive during surgery, or related to certain procedures.  You may need to have a catheter placed until your bladder recovers.  When planning to go to an Emergency department, it may help to call the ER to let them know you are coming in for this problem after a surgery.  This may help you get in quicker to be evaluated.  **If you have symptoms of a urinary tract infection, please contact your primary physician for the proper evaluation and treatment.          If you have other questions, please call the office Monday thru Friday between 8am and 5pm to discuss with the nurse.  # [phone]    There is a surgeon ON CALL on weekday evenings and over the weekend in case of urgent need only, and may be contacted at the same number.    If you are having an emergency, call 911 or proceed  to your nearest emergency department.                      Same Day Surgery Discharge Instructions  Special Precautions After Surgery - Adult    It is not unusual to feel lightheaded or faint, up to 24 hours after surgery or while taking pain medication.  If you have these symptoms; sit for a few minutes before standing and have someone assist you when getting up.  You should rest and relax for the next 24 hours and must have someone stay with you for at least 24 hours after your discharge.  DO NOT DRIVE any vehicle or operate mechanical equipment for 24 hours following the end of your surgery.  DO NOT DRIVE while taking narcotic pain medications that have been prescribed by your physician.  If you had a limb operated on, you must be able to use it fully to drive.  DO NOT drink alcoholic beverages for 24 hours following surgery or while taking prescription pain medication.  Drink clear liquids (apple juice, ginger ale, broth, 7-Up, etc.).  Progress to your regular diet as you feel able.  Any questions call your physician and do not make important decisions for 24 hours.      Medications:  Next Ibuprofen @ 6:30pm  Tylenol anytime       __________________________________________________________________________________________________________________________________  IMPORTANT NUMBERS:    [facility] [place]  [phone], [phone]  Pharmacy:  [phone]  Same Day Surgery:  [phone], Monday - Friday until 8:30 p.m.  Urgent Care:  [phone]  Emergency Room:  [phone]      [facility]:  [phone]                                                                             [facility]:  [phone] option 1  [facility]:  [phone]     [facility]:  [phone]   [facility]:  [phone]   Home Medical Equipment: [phone]  [facility]:  [phone]

## 2023-07-22 NOTE — OP NOTE
Procedure Date: July 22, 2023    PREOPERATIVE DIAGNOSIS: 1. Small Bowel Obstruction    POSTOPERATIVE DIAGNOSIS:   1. Small bowel obstruction, enteritis  2. Endoemtriosis    PROCEDURE:  1. Diagnostic laparoscopy  2. Fulguration of endometriosis    ATTENDING SURGEON and Assistants:   Surgeon(s):  Christophe Cardenas DO    ESTIMATED BLOOD LOSS: 3 mL    FINDINGS:   1. Transition point mid jejunum with enteritis, no observed adhesions or internal hernia  2. Limited endometriosis on mid uterine fundus    INDICATIONS: Ms. Indu Dumont is a 52 year old white female who presents with findings concerning for closed loop small bowel obstruction on CT. A discussion was held with the patient regarding indications, risks, benefits, and alternatives to surgery (including, but not limited to bleeding, infection, intra-abdominal injury, conversion from laparoscopy to open surgery, nontherapeutic operation, potential alternate etiology of presenting symptoms, abscess, potential need for additional treatments, and the cardiopulmonary risks associated with surgery and anesthesia). The patient's questions were addressed, and she consented to diagnostic laparoscopy, possible laparotomy, possible bowel resection and all indicated procedures.     PROCEDURE: The patient was brought to the operating room, and placed in the supine position on the operating table. Lower extremity sequential compression devices were placed and functioning prior to delivery of general anesthesia, which included endotracheal intubation. A urinary catheter was not placed, as the patient urinated just prior to arrival to the operating room. The abdomen was then prepped and draped in the usual, sterile fashion. A procedural time out was performed.     An open Stas approach was undertaken via a periumbilical incision, and under direct visualization a 5-mm trochar was delivered and pneumoperitoneum administered. Additional 5mm trochars were delivered in  the right upper and right lower quadrant abdomen under direct visualization via laparoscopy based on CT imaging.    The terminal ileum was located and appeared normal.  The bowel was then run laparoscopically from the cecum to the ligament of treitz.  At the mid jejunum, there was an obvious change in bowel caliber and it appeared to release with minimal traction.  This segment was dilated with mild inflammation including hyperemia and mesenteric congestion.  It appeared otherwise pink and peristalsed immediately upon release.  The bowel then appeared normal in caliber with minimal change to the ligament of Treitz.  The patient was then placed in Trendelenburg and the area where the bowel was lying was examined.  No epiploic attachments, adhesions were visible.  On the anterior fundus of the uterus a small endometrioma was located, this was fulgurated with cautery after brief discussion with OB via phone.  No other endometriomas were located.  The ovaries appeared within normal limits. The colon appeared grossly normal.  The involved segment was again observed and the more distal bowel had begun to fill with succus.  No obvious filling defects were visualized and the bowel appeared hyperemic but viable.  The liver appeared normal.  Reactive serous fluid was suctioned free from the abdominal cavity.  The appendix was examined and appeared normal though located in the right colic gutter.      Satisfied there was no obstruction, the procedure was terminated.    Suction was utilized to clear the field and in the assessment for hemostasis, which was assured.  The ports were removed under direct visualization. Fascial closure at the umbilical  port site was accomplished with a single 0-Vicryl suture. Skin edges were re-approximated with 4-0 Monocryl suture, and the wounds were all cleansed and dried prior to application of sterile glue. The patient tolerated the procedure well, was extubated without incident, and  transferred to the PACU in stable condition.    Christophe Cardenas DO on 7/22/2023 at 12:41 PM

## 2023-07-22 NOTE — MEDICATION SCRIBE - ADMISSION MEDICATION HISTORY
Medication Scribe Admission Medication History    Admission medication history is complete. The information provided in this note is only as accurate as the sources available at the time of the update.    Medication reconciliation/reorder completed by provider prior to medication history? No    Information Source(s): Patient and CareEverywhere/SureScripts via  in room with patient.    Pertinent Information: Does not take Vitamin D every day.    Changes made to PTA medication list:  Added: Vitamin D 2000 unit chew.  Deleted: None  Changed: None    Medication Affordability:  Not including over the counter (OTC) medications, was there a time in the past 3 months when you did not take your medications as prescribed because of cost?: No    Allergies reviewed with patient and updates made in EHR: yes, added Brazil nuts.    Medication History Completed By: Agnes Kelley 7/22/2023 9:55 AM    Prior to Admission medications    Medication Sig Last Dose Taking? Auth Provider Long Term End Date   Cholecalciferol 50 MCG (2000 UT) CHEW Take 1 chew tab by mouth daily 7/20/2023 at am Yes Reported, Patient

## 2023-07-22 NOTE — ED PROVIDER NOTES
History     Chief Complaint   Patient presents with    Abdominal Pain    Chest Pain     HPI  Indu Dumont is a 52 year old female who presents with epigastric abdominal pain.  Patient describes a sensation of intermittent chest pressure with nausea but no vomiting.  Patient reports the pain radiates to her back on intake on arrival.    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  She reports that she developed sudden epigastric pain this morning that caused her to have nausea and some vomiting on arrival.  She is visiting friends from [facility] to celebrate for the weekend and did report having 2 alcoholic beverages yesterday including 2 spoons of rum.  She does not drink routinely.  She takes active medications.  She reports that she had 2 C-sections-last 22 years ago, history of laparoscopy-in the 1980s for exploratory assessment..  No other abdominal surgeries.  She reports pain radiates to the back and did go into the retrosternal area.  She reports no shortness of breath orthopnea.  She has had no melena or hematochezia.  With persistent intermittent pain from 4-5 out of 10 to 8 out of 10 she presents for further care.  No fever or chills.  No urinary symptoms    Allergies:  Allergies   Allergen Reactions    Codeine Nausea and Vomiting    Penicillins Rash    Sulfa Antibiotics Rash       Problem List:    There are no problems to display for this patient.       Past Medical History:    No past medical history on file.    Past Surgical History:    No past surgical history on file.    Family History:    No family history on file.    Social History:  Marital Status:   [2]        Medications:    No current outpatient medications on file.        Review of Systems   HENT: Negative.     Eyes: Negative.    Respiratory: Negative.     Cardiovascular: Negative.    Gastrointestinal:  Positive for abdominal pain, nausea and  "vomiting.   Endocrine: Negative.    Genitourinary: Negative.    Musculoskeletal: Negative.    Skin: Negative.    Allergic/Immunologic: Negative.    Neurological: Negative.    Hematological: Negative.    Psychiatric/Behavioral: Negative.     All other systems reviewed and are negative.      Physical Exam   BP: (!) 140/85  Pulse: 85  Temp: 97.9  F (36.6  C)  Resp: 20  Height: 157.5 cm (5' 2\")  Weight: 61.2 kg (135 lb)  SpO2: 99 %      Physical Exam  Constitutional:       General: She is not in acute distress.     Appearance: She is well-developed. She is not ill-appearing, toxic-appearing or diaphoretic.   HENT:      Head: Normocephalic and atraumatic.   Eyes:      Extraocular Movements: Extraocular movements intact.      Pupils: Pupils are equal, round, and reactive to light.   Cardiovascular:      Rate and Rhythm: Normal rate and regular rhythm.   Pulmonary:      Effort: Pulmonary effort is normal. No respiratory distress.      Breath sounds: Normal breath sounds. No stridor. No wheezing, rhonchi or rales.   Chest:      Chest wall: No tenderness.   Abdominal:      General: Abdomen is flat.      Palpations: Abdomen is soft.      Tenderness: There is abdominal tenderness in the epigastric area.       Skin:     Capillary Refill: Capillary refill takes less than 2 seconds.      Coloration: Skin is not cyanotic, jaundiced, mottled or pale.      Findings: No erythema or rash.   Neurological:      General: No focal deficit present.      Mental Status: She is alert and oriented to person, place, and time.   Psychiatric:         Mood and Affect: Mood normal. Mood is not anxious or depressed.         Behavior: Behavior normal.         ED Course                 Procedures              EKG Interpretation:      Interpreted by Mg Orellana MD  Time reviewed: 0825  Symptoms at time of EKG: Epigastric abdominal pain and chest pressure  Rhythm: normal sinus   Rate: Normal  Axis: Normal  Ectopy: none  Conduction: normal  ST " Segments/ T Waves: Non-specific ST-T wave changes  Q Waves: nonspecific  Comparison to prior: No old viewable EKG for comparison    Clinical Impression: no acute changes        Critical Care time:  30 minutes             ED medications:  Medications   HYDROmorphone (PF) (DILAUDID) injection 0.5 mg (0.5 mg Intravenous $Given 7/22/23 0852)   sodium chloride 0.9% infusion (has no administration in time range)   ondansetron (ZOFRAN) injection 4 mg (4 mg Intravenous $Given 7/22/23 0821)   0.9% sodium chloride BOLUS (1,000 mLs Intravenous $New Bag 7/22/23 0850)   iopamidol (ISOVUE-370) solution 60 mL (60 mLs Intravenous $Given 7/22/23 0905)   sodium chloride 0.9 % bag 500mL for CT scan flush use (56 mLs Intravenous $Given 7/22/23 0905)     ED Vitals;  Vitals:    07/22/23 0817 07/22/23 0830 07/22/23 0845 07/22/23 0900   BP: (!) 142/78 119/82  111/72   Pulse: 81  74 71   Resp: 14  10 (!) 8   Temp:       TempSrc:       SpO2:   91% 96%   Weight:       Height:          ED labs and imaging:  Results for orders placed or performed during the hospital encounter of 07/22/23   CT Abdomen Pelvis w Contrast     Status: Abnormal   Result Value Ref Range    Radiologist flags Closed loop obstruction (Urgent)     Narrative    EXAM: CT ABDOMEN PELVIS W CONTRAST  LOCATION: [facility]  DATE: 7/22/2023    INDICATION: Epigastric abdominal pain.  Concern for acute pancreatitis.  Evaluate for other acute vascular or intra abdominal process.  COMPARISON: None.  TECHNIQUE: CT scan of the abdomen and pelvis was performed following injection of IV contrast. Multiplanar reformats were obtained. Dose reduction techniques were used.  CONTRAST: 60 mL Isovue 370    FINDINGS:   LOWER CHEST: Normal.    HEPATOBILIARY: Normal.    PANCREAS: Normal.    SPLEEN: Normal.    ADRENAL GLANDS: There are adrenal lesions less than 1 cm. No follow up is needed for nodules this size.    KIDNEYS/BLADDER: Normal.    BOWEL: Mildly dilated distal  jejunum and proximal ileum with associated free fluid and mesenteric edema. There is transition to decompressed distal ileum in the right lower quadrant (3/158, 4/30). Additionally, one of the dilated segments of small bowel   has disproportionate wall thickening compared to the other loops) 3/161) and has 2 adjacent transition points (3/156, 4/27). Uniform enhancement of the bowel wall. No extraluminal gas, pneumatosis, or fluid collection. The colon is normal caliber.    LYMPH NODES: Normal.    VASCULATURE: Unremarkable.    PELVIC ORGANS: Unremarkable. Small volume free fluid in the pelvis.    MUSCULOSKELETAL: Normal.      Impression    IMPRESSION:   Acute small bowel obstruction with distal transition point in the distal ileum. One of the dilated segments of distal jejunum upstream also has disproportionate wall thickening, as well as adjacent proximal and distal transition points concerning for   closed loop obstruction. No evidence of perforation or ischemia.    [Urgent Result: Closed loop obstruction]    Finding was identified on 7/22/2023 9:30 AM CDT.     Mg Orellana was contacted by me on 7/22/2023 9:34 AM CDT and verbalized understanding of the critical result.   CBC with platelets     Status: Abnormal   Result Value Ref Range    WBC Count 14.2 (H) 4.0 - 11.0 10e3/uL    RBC Count 5.21 (H) 3.80 - 5.20 10e6/uL    Hemoglobin 15.8 (H) 11.7 - 15.7 g/dL    Hematocrit 45.9 35.0 - 47.0 %    MCV 88 78 - 100 fL    MCH 30.3 26.5 - 33.0 pg    MCHC 34.4 31.5 - 36.5 g/dL    RDW 12.3 10.0 - 15.0 %    Platelet Count 236 150 - 450 10e3/uL   Basic metabolic panel     Status: Abnormal   Result Value Ref Range    Sodium 140 136 - 145 mmol/L    Potassium 3.6 3.4 - 5.3 mmol/L    Chloride 102 98 - 107 mmol/L    Carbon Dioxide (CO2) 21 (L) 22 - 29 mmol/L    Anion Gap 17 (H) 7 - 15 mmol/L    Urea Nitrogen 13.7 6.0 - 20.0 mg/dL    Creatinine 0.76 0.51 - 0.95 mg/dL    Calcium 10.3 (H) 8.6 - 10.0 mg/dL    Glucose 119 (H) 70 - 99  mg/dL    GFR Estimate >90 >60 mL/min/1.73m2   Lipase     Status: Normal   Result Value Ref Range    Lipase 17 13 - 60 U/L   Troponin T, High Sensitivity     Status: Normal   Result Value Ref Range    Troponin T, High Sensitivity <6 <=14 ng/L   Extra Tube     Status: None    Narrative    The following orders were created for panel order Extra Tube.  Procedure                               Abnormality         Status                     ---------                               -----------         ------                     Extra Blue Top Tube[[id number]]                              Final result               Extra Red Top Tube[[id number]]                               Final result                 Please view results for these tests on the individual orders.   Extra Blue Top Tube     Status: None   Result Value Ref Range    Hold Specimen JIC    Extra Red Top Tube     Status: None   Result Value Ref Range    Hold Specimen JIC        Assessments & Plan (with Medical Decision Making)   Assessment Summary and Clinical Impression: 52-year-old female who presented with sudden onset of epigastric abdominal pain and chest pressure that woke her up from sleep prior to ED arrival.  She was captured to be afebrile.  Blood pressure was 142/78 on arrival.  She admitted to celebrating with [facility] friends and had some alcoholic drinks yesterday but no excessive intake and no chronic alcohol use.  History of 2  sections in the distant past last 22 years, and a episode of diagnostic laparoscopy in the . Patient reported having a vomiting episode that was nonbilious and nonprojectile and no hemoptysis or hematemesis.  Pain was intermittent and described to be at worst an 8 out of 10 on exam 5 out of 10.  She was offered therapies to manage her pain and discomfort. We reviewed possible causes and a work-up was undertaken.  Abdominal imaging with contrast today revealed concern for small bowel obstruction with concern for  possible closed-loop obstruction.  After consultation with general surgeon on-call plan is for further definitive management and care per surgery- to the OR.    ED course and plan:  Reviewed the medical record.  EKG on arrival revealed no acute ischemia with nonspecific T wave changes.  With no old EKG for comparison.  Given location of pain and discomfort reviewed possible causes for her symptoms she was offered therapies to manage her pain and discomfort is rated and imaging with contrast was obtained to evaluate for acute pancreatitis or other acute vascular or intra-abdominal process.  Work-up on arrival revealed white count of 14.2.  Hemoglobin was 15.8.  Normal troponin.  Lipase was also normal.  I elected to proceed with CT imaging of the abdomen pelvis to evaluate for other processes that could explain patient's pain and discomfort given location of pain and discomfort.    Spoke with Dr. LUIS Garibay at 9.35am- reading radiologist who reported CT showing concern for small bowel obstruction with transition point in right lower quadrant however there is concern for closed-loop obstruction with thickening in the distal jejunum however no features to suggest acute ischemia.   See details outlined in the radiology report    Given CT findings I reviewed patient's presentation with surgery on-call- Dr. KEN Cardenas at 9.40am  Discussed report from reading radiologist and concern for possible closed-loop obstruction.  See surgery consult note for further details of definitive management and care.  Dr Cardenas requested nasogastric tube decompression with plan to go to the OR.  Updated patient on imaging findigns and plan of care.Patient reported her last meal was 8 PM last night when she ate rice chicken and salad.           Pre-OP  Clearance     Please see the body of above note or dictation for the history and physical.      No medical contraindication to emergent surgery or anesthesia identified. No additional  modifiable risk identified.  Informed consent deferred to surgeon.         Disclaimer: This note consists of symbols derived from keyboarding, dictation and/or voice recognition software. As a result, there may be errors in the script that have gone undetected. Please consider this when interpreting information found in this chart.   I have reviewed the nursing notes.    I have reviewed the findings, diagnosis, plan and need for follow up with the patient.           Medical Decision Making  The patient's presentation was of moderate complexity (an acute illness with systemic symptoms).    The patient's evaluation involved:  ordering and/or review of 2 test(s) in this encounter (diagnostic imaging and labs)    The patient's management necessitated moderate risk (prescription drug management including medications given in the ED).        New Prescriptions    No medications on file       Final diagnoses:   Small bowel obstruction (H)   Abnormal CT of the abdomen - Acute small bowel obstruction with distal transition point in the distal ileum. One of the dilated segments of distal jejunum upstream also has disproportionate wall thickening, as well as adjacent proximal and distal transition points concerning for  closed loop obstruction.       7/22/2023   [facility] EMERGENCY DEPT       Mg Orellana MD  07/22/23 8537

## 2023-07-22 NOTE — ED NOTES
"Patient c/o of mid epigastric pain that woke her in the night. She had a small diarrhea but that is common for her. Nausea with emesis on arrival to floor. Pain radiates to back and into chest questions if  \"gerds\". No one else in home is sick  "

## 2023-07-22 NOTE — ANESTHESIA POSTPROCEDURE EVALUATION
Patient: Indu Dumont    Procedure: Procedure(s):  diagnositic laparoscopy, fulgoration of endometriosis       Anesthesia Type:  General    Note:  Disposition: Outpatient   Postop Pain Control: Uneventful            Sign Out: Well controlled pain   PONV: No   Neuro/Psych: Uneventful            Sign Out: Acceptable/Baseline neuro status   Airway/Respiratory: Uneventful            Sign Out: Acceptable/Baseline resp. status   CV/Hemodynamics: Uneventful            Sign Out: Acceptable CV status; No obvious hypovolemia; No obvious fluid overload   Other NRE:    DID A NON-ROUTINE EVENT OCCUR?            Last vitals:  Vitals Value Taken Time   /54 07/22/23 1252   Temp 36.6  C (97.9  F) 07/22/23 1252   Pulse 104 07/22/23 1300   Resp 9 07/22/23 1300   SpO2 98 % 07/22/23 1300   Vitals shown include unvalidated device data.    Electronically Signed By: ROSA ELENA Cristobal CRNA  July 22, 2023  1:02 PM

## 2023-07-22 NOTE — H&P
"Surgical Consultation/History and Physical  [facility] General Surgery    Indu is seen in consultation for Abdominal pain    Chief Complaint:  Abdominal pain    History of Present Illness: Indu Dumont is an otherwise healthy 52 year old female presents with abdominal pain.  Patient states she has had mild abdominal discomfort prior to yesterday which acutely increased overnight/early morning at 0200.  Pain was severe, came in waves and radiated to her back. She had associated nausea, bloating, emesis.  She did have normal bowel function last evening with passage of flatus and normal stool.  She has no prior history of bowel obstruction.  She had a negative colonoscopy at age 49.  No other sick contacts, suspect foods, or excessive alcohol.  She denies history of Crohn's, UC or colon cancer in her family.  She has a prior surgical history of C section x 2, diagnostic laparoscopy in the 1980s and ablation for HPV.  She does feel better after pain medications and emesis x 1 in ED.    There is no problem list on file for this patient.    No past medical history on file.    No past surgical history on file.    No family history on file.    Social History     Tobacco Use    Smoking status: Not on file    Smokeless tobacco: Not on file   Substance Use Topics    Alcohol use: Not on file        History   Drug Use Not on file       Current Outpatient Medications   Medication Sig Dispense Refill    Cholecalciferol 50 MCG (2000 UT) CHEW Take 1 chew tab by mouth daily         Allergies   Allergen Reactions    Food Anaphylaxis     Brazil nut    Codeine Nausea and Vomiting    Penicillins Rash    Sulfa Antibiotics Rash     Physical Exam:  /69   Pulse 89   Temp 97.9  F (36.6  C) (Oral)   Resp (!) 8   Ht 1.575 m (5' 2\")   Wt 61.2 kg (135 lb)   SpO2 99%   BMI 24.69 kg/m      Constitutional- No acute distress, well nourished, non-toxic  Eyes: Anicteric, no injection.  PERRL  ENT:  Normocephalic, atraumatic, " Nose midline, moist mucus membranes; NGT in place with clear drainage  Neck - supple, no LAD  Respiratory- Good inspiratory effort  Cardiovascular -   No peripheral edema.  No clubbing.  Abdomen - Soft, minimal to no tenderness, minimal distension, well healed laparoscopic port site.  No Rigidity, rebound, guarding  Neuro - No focal neuro deficits, Alert and oriented x 3  Psych: Appropriate mood and affect  Musculoskeletal: Normal gait, symmetric strength.  FROM upper and lower extremities.  Skin: Warm, Dry    Lab Results   Component Value Date    WBC 14.2 07/22/2023     Lab Results   Component Value Date    RBC 5.21 07/22/2023     Lab Results   Component Value Date    HGB 15.8 07/22/2023     Lab Results   Component Value Date    HCT 45.9 07/22/2023     Lab Results   Component Value Date    MCV 88 07/22/2023     Lab Results   Component Value Date    MCH 30.3 07/22/2023     Lab Results   Component Value Date    MCHC 34.4 07/22/2023     Lab Results   Component Value Date    RDW 12.3 07/22/2023     Lab Results   Component Value Date     07/22/2023     Last Comprehensive Metabolic Panel:  Sodium   Date Value Ref Range Status   07/22/2023 140 136 - 145 mmol/L Final     Potassium   Date Value Ref Range Status   07/22/2023 3.6 3.4 - 5.3 mmol/L Final     Chloride   Date Value Ref Range Status   07/22/2023 102 98 - 107 mmol/L Final     Carbon Dioxide (CO2)   Date Value Ref Range Status   07/22/2023 21 (L) 22 - 29 mmol/L Final     Anion Gap   Date Value Ref Range Status   07/22/2023 17 (H) 7 - 15 mmol/L Final     Glucose   Date Value Ref Range Status   07/22/2023 119 (H) 70 - 99 mg/dL Final     Urea Nitrogen   Date Value Ref Range Status   07/22/2023 13.7 6.0 - 20.0 mg/dL Final     Creatinine   Date Value Ref Range Status   07/22/2023 0.76 0.51 - 0.95 mg/dL Final     GFR Estimate   Date Value Ref Range Status   07/22/2023 >90 >60 mL/min/1.73m2 Final     Calcium   Date Value Ref Range Status   07/22/2023 10.3 (H) 8.6 -  10.0 mg/dL Final     Lactic acid 1.0    CT Abdomen/Pelvis:  INDICATION: Epigastric abdominal pain.  Concern for acute pancreatitis.  Evaluate for other acute vascular or intra abdominal process.  COMPARISON: None.  TECHNIQUE: CT scan of the abdomen and pelvis was performed following injection of IV contrast. Multiplanar reformats were obtained. Dose reduction techniques were used.  CONTRAST: 60 mL Isovue 370     FINDINGS:   LOWER CHEST: Normal.     HEPATOBILIARY: Normal.     PANCREAS: Normal.     SPLEEN: Normal.     ADRENAL GLANDS: There are adrenal lesions less than 1 cm. No follow up is needed for nodules this size.     KIDNEYS/BLADDER: Normal.     BOWEL: Mildly dilated distal jejunum and proximal ileum with associated free fluid and mesenteric edema. There is transition to decompressed distal ileum in the right lower quadrant (3/158, 4/30). Additionally, one of the dilated segments of small bowel   has disproportionate wall thickening compared to the other loops) 3/161) and has 2 adjacent transition points (3/156, 4/27). Uniform enhancement of the bowel wall. No extraluminal gas, pneumatosis, or fluid collection. The colon is normal caliber.     LYMPH NODES: Normal.     VASCULATURE: Unremarkable.     PELVIC ORGANS: Unremarkable. Small volume free fluid in the pelvis.     MUSCULOSKELETAL: Normal.                                                                      IMPRESSION:   Acute small bowel obstruction with distal transition point in the distal ileum. One of the dilated segments of distal jejunum upstream also has disproportionate wall thickening, as well as adjacent proximal and distal transition points concerning for   closed loop obstruction. No evidence of perforation or ischemia.    Assessment:  1. Small bowel obstruction, possible closed loop obstruction    Plan:   Mrs. Dumont is an otherwise health 51 yo woman who presents with acute onset of generalized abdominal pain.  Her exam is benign and she  appears comfortable at this time with ED interventions.  I performed an independent history, physical and reviewed her labs/imaging personally.  I had a peng discussion with her about small bowel obstructions, particularly closed loop obstructions and need for urgent evaluation.  We discussed period of observation versus immediate diagnostic laparoscopy and she wishes to proceed with intervention which I feel is prudent.  I discussed the indications, risks, benefits, alternatives to diagnostic laparoscopy, possible laparotomy, possible bowel resection and all indicated procedures.  Risks discussed include but are not limited to: bleeding, infection, anastomotic dehiscence (a leak where the bowels are reconnected), potential for ostomy (temporary or permanent), potential for recurrence, potential for ileus (delayed recovery in bowel function), potential need for additional treatments/surgery, injury to intra-abdominal structures, conversion from minimally invasive operation to open surgery, difficult recovery from anesthesia (including prolonged ventilation), heart attack, blood clots (DVT or Pulmonary Embolism), stroke, or death.    I discussed possibility of alternative pathologies including negative diagnostic laparoscopy and she wishes to proceed.  TAP block to be consented in case laparotomy is performed.  All questions answered to the best of my ability.      Christophe Cardenas, DO on 7/22/2023 at 11:11 AM

## 2023-07-22 NOTE — ANESTHESIA CARE TRANSFER NOTE
Patient: Indu Dumont    Procedure: Procedure(s):  diagnositic laparoscopy, fulgoration of endometriosis       Diagnosis: Small bowel obstruction (H) [K56.609]  Diagnosis Additional Information: No value filed.    Anesthesia Type:   General     Note:    Oropharynx: oropharynx clear of all foreign objects  Level of Consciousness: awake      Independent Airway: airway patency satisfactory and stable  Dentition: dentition unchanged  Vital Signs Stable: post-procedure vital signs reviewed and stable  Report to RN Given: handoff report given  Patient transferred to: ICU (weekend recovery room)    ICU Handoff: Call for PAUSE to initiate/utilize ICU HANDOFF, Identified Patient, Identified Responsible Provider, Reviewed the Pertinent Medical History, Discussed Surgical Course, Reviewed Intra-OP Anesthesia Management and Issues during Anesthesia, Set Expectations for Post Procedure Period and Allowed Opportunity for Questions and Acknowledgement of Understanding      Vitals:  Vitals Value Taken Time   /54 07/22/23 1252   Temp 36.6  C (97.9  F) 07/22/23 1252   Pulse 103 07/22/23 1300   Resp 7 07/22/23 1300   SpO2 98 % 07/22/23 1300   Vitals shown include unvalidated device data.    Electronically Signed By: ROSA ELENA Cristobal CRNA  July 22, 2023  1:01 PM

## 2023-07-22 NOTE — ANESTHESIA PROCEDURE NOTES
Airway         Procedure Start/Stop Times: 7/22/2023 11:32 AM and 7/22/2023 11:33 AM  Staff -        CRNA: Young Crawley APRN CRNA       Performed By: CRNA  Consent for Airway        Urgency: elective  Indications and Patient Condition       Indications for airway management: airway protection and elisha-procedural       Induction type:RSI       Mask difficulty assessment: 0 - not attempted    Final Airway Details       Final airway type: endotracheal airway       Successful airway: ETT - single  Endotracheal Airway Details        ETT size (mm): 6.5       Cuffed: yes       Successful intubation technique: video laryngoscopy       VL Blade Size: Dior 3       Grade View of Cords: 1       Adjucts: stylet       Position: Center       Measured from: gums/teeth       Secured at (cm): 21       Bite block used: None    Post intubation assessment        Placement verified by: capnometry and equal breath sounds        Number of attempts at approach: 1       Secured with: plastic tape       Ease of procedure: easy       Dentition: Intact    Medication(s) Administered   Medication Administration Time: 7/22/2023 11:32 AM

## 2023-07-22 NOTE — ANESTHESIA PREPROCEDURE EVALUATION
Anesthesia Pre-Procedure Evaluation    Patient: Indu Dumont   MRN: [id number] : 1970        Procedure : Procedure(s):  LAPAROSCOPY, DIAGNOSTIC, BY GENERAL SURGERY  LAPAROTOMY, POSSIBLE BOWEL RESECTION AND ALL INDICATED PROCEDURES          No past medical history on file.   No past surgical history on file.   Allergies   Allergen Reactions    Food Anaphylaxis     Brazil nut    Codeine Nausea and Vomiting    Penicillins Rash    Sulfa Antibiotics Rash      Social History     Tobacco Use    Smoking status: Not on file    Smokeless tobacco: Not on file   Substance Use Topics    Alcohol use: Not on file      Wt Readings from Last 1 Encounters:   23 61.2 kg (135 lb)        Anesthesia Evaluation            ROS/MED HX  ENT/Pulmonary:  - neg pulmonary ROS     Neurologic:  - neg neurologic ROS     Cardiovascular:  - neg cardiovascular ROS     METS/Exercise Tolerance: >4 METS    Hematologic:  - neg hematologic  ROS     Musculoskeletal:  - neg musculoskeletal ROS     GI/Hepatic: Comment: Bowel obstruction?   - neg GI/hepatic ROS     Renal/Genitourinary:  - neg Renal ROS     Endo:  - neg endo ROS     Psychiatric/Substance Use:  - neg psychiatric ROS     Infectious Disease:  - neg infectious disease ROS     Malignancy:  - neg malignancy ROS     Other:  - neg other ROS          Physical Exam    Airway        Mallampati: II   TM distance: > 3 FB   Neck ROM: full   Mouth opening: > 3 cm    Respiratory Devices and Support  Comment: Not on oxygen currently       Dental  no notable dental history         Cardiovascular   cardiovascular exam normal          Pulmonary   pulmonary exam normal                OUTSIDE LABS:  CBC:   Lab Results   Component Value Date    WBC 14.2 (H) 2023    HGB 15.8 (H) 2023    HCT 45.9 2023     2023     BMP:   Lab Results   Component Value Date     2023    POTASSIUM 3.6 2023    CHLORIDE 102 2023    CO2 21 (L) 2023    BUN 13.7  07/22/2023    CR 0.76 07/22/2023     (H) 07/22/2023     COAGS: No results found for: PTT, INR, FIBR  POC: No results found for: BGM, HCG, HCGS  HEPATIC: No results found for: ALBUMIN, PROTTOTAL, ALT, AST, GGT, ALKPHOS, BILITOTAL, BILIDIRECT, TERENCE  OTHER:   Lab Results   Component Value Date    LACT 1.0 07/22/2023    LEIGHA 10.3 (H) 07/22/2023    LIPASE 17 07/22/2023       Anesthesia Plan    ASA Status:  2, emergent       Anesthesia Type: General.   Induction: Intravenous, Propofol.   Maintenance: TIVA.        Consents    Anesthesia Plan(s) and associated risks, benefits, and realistic alternatives discussed. Questions answered and patient/representative(s) expressed understanding.     - Discussed: Risks, Benefits and Alternatives for BOTH SEDATION and the PROCEDURE were discussed     - Discussed with:  Patient            Postoperative Care    Pain management: IV analgesics, Oral pain medications, Multi-modal analgesia.   PONV prophylaxis: Ondansetron (or other 5HT-3), Dexamethasone or Solumedrol     Comments:    Other Comments: Patient aware of plan, what to expect, and potential risks. Timeout was performed before bringing the patient back to OR, and once again, patient was asked if they had any questions            ROSA ELENA Cristobal CRNA

## 2023-07-22 NOTE — ED TRIAGE NOTES
Pt here with mid upper abdominal pain that woke pt up at 02-03 today. Pt also has intermittent chest pressure, she is not sure if it's anxiety. Nauseated but no vomiting. Two normal BM since waking up. Pt also has pain into her back between shoulder blades and in lower back.      Triage Assessment       Row Name 07/22/23 0757       Triage Assessment (Adult)    Airway WDL WDL       Respiratory WDL    Respiratory WDL WDL       Cardiac WDL    Cardiac WDL X;chest pain       Chest Pain Assessment    Chest Pain Location midsternal    Character pressure       Cognitive/Neuro/Behavioral WDL    Cognitive/Neuro/Behavioral WDL WDL

## 2023-07-23 ASSESSMENT — ACTIVITIES OF DAILY LIVING (ADL)
ADLS_ACUITY_SCORE: 35

## 2023-07-24 ENCOUNTER — TELEPHONE (OUTPATIENT)
Dept: SURGERY | Facility: CLINIC | Age: 53
End: 2023-07-24
Payer: COMMERCIAL

## 2023-07-24 ASSESSMENT — ACTIVITIES OF DAILY LIVING (ADL)
ADLS_ACUITY_SCORE: 35

## 2023-07-24 NOTE — TELEPHONE ENCOUNTER
Type of surgery: LAPAROSCOPY, DIAGNOSTIC, BY GENERAL SURGERY (N/A)   LAPAROTOMY, POSSIBLE BOWEL RESECTION AND ALL INDICATED PROCEDURES (N/A)   Location of surgery: [facility]  Date and time of surgery: 07/22/2023  Surgeon: BARRY  Pre-Op Appt Date: SEEN ON CALL   Post-Op Appt Date: TBD   Packet sent out: No  Pre-cert/Authorization completed:  No  Date:

## 2023-07-25 ASSESSMENT — ACTIVITIES OF DAILY LIVING (ADL)
ADLS_ACUITY_SCORE: 35

## 2023-07-26 ASSESSMENT — ACTIVITIES OF DAILY LIVING (ADL)
ADLS_ACUITY_SCORE: 35

## 2023-07-27 ASSESSMENT — ACTIVITIES OF DAILY LIVING (ADL)
ADLS_ACUITY_SCORE: 35

## 2023-07-28 ENCOUNTER — TELEPHONE (OUTPATIENT)
Dept: SURGERY | Facility: CLINIC | Age: 53
End: 2023-07-28
Payer: COMMERCIAL

## 2023-07-28 ASSESSMENT — ACTIVITIES OF DAILY LIVING (ADL)
ADLS_ACUITY_SCORE: 35

## 2023-07-28 NOTE — TELEPHONE ENCOUNTER
Reason for Call:  Form, our goal is to have forms completed with 72 hours, however, some forms may require a visit or additional information.    Type of letter, form or note:  disability    Who is the form from?: Insurance comp    Where did the form come from: form was faxed in    What clinic location was the [facility]     Where the form was placed: Given to physician    What number is listed as a contact on the form?: [phone]       Additional comments:     Call taken on 7/28/2023 at 11:33 AM by Gio Vasques

## 2023-07-29 ASSESSMENT — ACTIVITIES OF DAILY LIVING (ADL)
ADLS_ACUITY_SCORE: 35

## 2023-07-30 ASSESSMENT — ACTIVITIES OF DAILY LIVING (ADL)
ADLS_ACUITY_SCORE: 35

## 2023-07-31 ENCOUNTER — TELEPHONE (OUTPATIENT)
Dept: SURGERY | Facility: CLINIC | Age: 53
End: 2023-07-31
Payer: COMMERCIAL

## 2023-07-31 ASSESSMENT — ACTIVITIES OF DAILY LIVING (ADL)
ADLS_ACUITY_SCORE: 35

## 2023-07-31 NOTE — TELEPHONE ENCOUNTER
Reason for Call:  Other     Detailed comments: Patient calling requesting a return to work letter stating she can return to work today and with 20lb lifting restrictions for for weeks from date of surgery.     Phone Number Patient can be reached at: Home number on file [phone] (home)    Best Time: any    Can we leave a detailed message on this number? YES    Call taken on 7/31/2023 at 9:47 AM by Gio Vasques

## 2023-07-31 NOTE — TELEPHONE ENCOUNTER
Faxed completed note to:  [facility]  Fax:  [phone]    Copy in file folder.    Gio Vasques  [facility]

## 2023-07-31 NOTE — TELEPHONE ENCOUNTER
Faxed completed forms to:  [facility]  Fax:  [phone]    Copy in file folder, sent to aminta Vasques  [facility]

## 2023-07-31 NOTE — LETTER
July 31, 2023      Indu Hughesailyn  [address] [place] [address]        To Whom It May Concern:    Indu Dumont is under my professional care. She may return to work with the following: limited to light duty - lifting no greater than 20 pounds on or about August 21, 2023.      Sincerely,        Christophe Cardenas, DO

## 2023-08-13 ENCOUNTER — HEALTH MAINTENANCE LETTER (OUTPATIENT)
Age: 53
End: 2023-08-13

## 2024-03-09 ENCOUNTER — E-ADVICE (OUTPATIENT)
Dept: OTHER | Age: 54
End: 2024-03-09

## 2024-03-19 ENCOUNTER — APPOINTMENT (OUTPATIENT)
Dept: OBGYN | Age: 54
End: 2024-03-19

## 2024-03-19 VITALS
BODY MASS INDEX: 25.88 KG/M2 | DIASTOLIC BLOOD PRESSURE: 82 MMHG | SYSTOLIC BLOOD PRESSURE: 118 MMHG | HEIGHT: 62 IN | WEIGHT: 140.6 LBS

## 2024-03-19 DIAGNOSIS — Z01.419 WELL WOMAN EXAM WITH ROUTINE GYNECOLOGICAL EXAM: Primary | ICD-10-CM

## 2024-03-19 PROCEDURE — 87624 HPV HI-RISK TYP POOLED RSLT: CPT | Performed by: CLINICAL MEDICAL LABORATORY

## 2024-03-19 PROCEDURE — 99396 PREV VISIT EST AGE 40-64: CPT | Performed by: OBSTETRICS & GYNECOLOGY

## 2024-03-19 PROCEDURE — 88175 CYTOPATH C/V AUTO FLUID REDO: CPT | Performed by: CLINICAL MEDICAL LABORATORY

## 2024-03-21 LAB
CASE RPRT: NORMAL
CLINICAL INFO: NORMAL
CYTOLOGY CVX/VAG STUDY: NORMAL
HPV16+18+45 E6+E7MRNA CVX NAA+PROBE: NEGATIVE
Lab: NORMAL
PAP EDUCATIONAL NOTE: NORMAL
SPECIMEN ADEQUACY: NORMAL

## 2024-03-25 ENCOUNTER — TELEPHONE (OUTPATIENT)
Dept: OBGYN | Age: 54
End: 2024-03-25

## 2024-03-25 ENCOUNTER — E-ADVICE (OUTPATIENT)
Dept: OBGYN | Age: 54
End: 2024-03-25

## 2024-05-19 ENCOUNTER — HEALTH MAINTENANCE LETTER (OUTPATIENT)
Age: 54
End: 2024-05-19

## 2025-02-10 ENCOUNTER — TELEPHONE (OUTPATIENT)
Dept: URGENT CARE | Age: 55
End: 2025-02-10

## 2025-02-10 ENCOUNTER — WALK IN (OUTPATIENT)
Dept: URGENT CARE | Age: 55
End: 2025-02-10

## 2025-02-10 VITALS
HEART RATE: 84 BPM | TEMPERATURE: 98.9 F | WEIGHT: 140 LBS | SYSTOLIC BLOOD PRESSURE: 124 MMHG | OXYGEN SATURATION: 97 % | DIASTOLIC BLOOD PRESSURE: 69 MMHG | RESPIRATION RATE: 18 BRPM | BODY MASS INDEX: 24.8 KG/M2 | HEIGHT: 63 IN

## 2025-02-10 DIAGNOSIS — J02.9 SORE THROAT: ICD-10-CM

## 2025-02-10 DIAGNOSIS — U07.1 COVID-19 VIRUS DETECTED: Primary | ICD-10-CM

## 2025-02-10 DIAGNOSIS — R05.1 ACUTE COUGH: ICD-10-CM

## 2025-02-10 LAB
FLUAV RNA RESP QL NAA+PROBE: NOT DETECTED
FLUBV RNA RESP QL NAA+PROBE: NOT DETECTED
RSV AG NPH QL IA.RAPID: NOT DETECTED
S PYO DNA THROAT QL NAA+PROBE: NOT DETECTED
SARS-COV-2 RNA RESP QL NAA+PROBE: DETECTED
TEST LOT EXPIRATION DATE: NORMAL
TEST LOT NUMBER: NORMAL

## 2025-02-10 PROCEDURE — 0241U POCT COVID/FLU/RSV PANEL: CPT

## 2025-02-10 PROCEDURE — 99203 OFFICE O/P NEW LOW 30 MIN: CPT

## 2025-02-10 PROCEDURE — 87651 STREP A DNA AMP PROBE: CPT

## 2025-02-20 SDOH — ECONOMIC STABILITY: HOUSING INSECURITY: WHAT IS YOUR LIVING SITUATION TODAY?: I HAVE A STEADY PLACE TO LIVE

## 2025-02-20 SDOH — ECONOMIC STABILITY: FOOD INSECURITY: WITHIN THE PAST 12 MONTHS, THE FOOD YOU BOUGHT JUST DIDN'T LAST AND YOU DIDN'T HAVE MONEY TO GET MORE.: NEVER TRUE

## 2025-02-20 SDOH — ECONOMIC STABILITY: TRANSPORTATION INSECURITY
IN THE PAST 12 MONTHS, HAS LACK OF RELIABLE TRANSPORTATION KEPT YOU FROM MEDICAL APPOINTMENTS, MEETINGS, WORK OR FROM GETTING THINGS NEEDED FOR DAILY LIVING?: NO

## 2025-02-20 SDOH — ECONOMIC STABILITY: HOUSING INSECURITY: DO YOU HAVE PROBLEMS WITH ANY OF THE FOLLOWING?: NONE OF THE ABOVE

## 2025-02-20 SDOH — ECONOMIC STABILITY: GENERAL: WOULD YOU LIKE HELP WITH ANY OF THE FOLLOWING NEEDS?: I DON'T WANT HELP WITH ANY OF THESE

## 2025-02-20 ASSESSMENT — SOCIAL DETERMINANTS OF HEALTH (SDOH): IN THE PAST 12 MONTHS, HAS THE ELECTRIC, GAS, OIL, OR WATER COMPANY THREATENED TO SHUT OFF SERVICE IN YOUR HOME?: NO

## 2025-02-25 ENCOUNTER — APPOINTMENT (OUTPATIENT)
Dept: OBGYN | Age: 55
End: 2025-02-25

## 2025-02-25 VITALS — WEIGHT: 143.6 LBS | BODY MASS INDEX: 26.43 KG/M2 | HEIGHT: 62 IN

## 2025-02-25 DIAGNOSIS — Z01.419 WELL WOMAN EXAM WITH ROUTINE GYNECOLOGICAL EXAM: Primary | ICD-10-CM

## 2025-02-25 PROCEDURE — 99396 PREV VISIT EST AGE 40-64: CPT | Performed by: OBSTETRICS & GYNECOLOGY

## 2025-06-08 ENCOUNTER — HEALTH MAINTENANCE LETTER (OUTPATIENT)
Age: 55
End: 2025-06-08

## 2025-08-31 ENCOUNTER — HEALTH MAINTENANCE LETTER (OUTPATIENT)
Age: 55
End: 2025-08-31

## (undated) DEVICE — ESU HOLSTER PLASTIC DISP E2400

## (undated) DEVICE — STOCKING SLEEVE COMPRESSION CALF LG

## (undated) DEVICE — STOCKING SLEEVE COMPRESSION CALF MED

## (undated) DEVICE — SU MONOCRYL 4-0 PS-2 18" UND Y496G

## (undated) DEVICE — GLOVE BIOGEL PI MICRO INDICATOR UNDERGLOVE SZ 8.0 48980

## (undated) DEVICE — SU VICRYL 0 UR-6 27" J603H

## (undated) DEVICE — Device

## (undated) DEVICE — ENDO TROCAR FIRST ENTRY KII FIOS ADV FIX 05X100MM CFF03

## (undated) DEVICE — GLOVE BIOGEL PI MICRO SZ 7.5 48575

## (undated) DEVICE — SU DERMABOND ADVANCED .7ML DNX12

## (undated) DEVICE — SYSTEM LAPAROVUE VISIBILITY LAPVUE10

## (undated) DEVICE — ESU PENCIL W/COATED BLADE E2450H

## (undated) DEVICE — SOL NACL 0.9% IRRIG 1000ML BOTTLE 07138-09

## (undated) DEVICE — DRAPE POUCH INSTRUMENT 3 POCKET 1018L

## (undated) DEVICE — ENDO TROCAR SLEEVE KII ADV FIXATION 05X100MM CFS02

## (undated) DEVICE — PREP CHLORAPREP 26ML TINTED ORANGE  260815

## (undated) DEVICE — TUBING SUCTION 12"X1/4" N612

## (undated) DEVICE — GOWN XLG DISP 9545

## (undated) RX ORDER — PROPOFOL 10 MG/ML
INJECTION, EMULSION INTRAVENOUS
Status: DISPENSED
Start: 2023-07-24

## (undated) RX ORDER — ONDANSETRON 2 MG/ML
INJECTION INTRAMUSCULAR; INTRAVENOUS
Status: DISPENSED
Start: 2023-07-22

## (undated) RX ORDER — ROPIVACAINE HYDROCHLORIDE 5 MG/ML
INJECTION, SOLUTION EPIDURAL; INFILTRATION; PERINEURAL
Status: DISPENSED
Start: 2023-07-23

## (undated) RX ORDER — TRANEXAMIC ACID 10 MG/ML
INJECTION, SOLUTION INTRAVENOUS
Status: DISPENSED
Start: 2023-07-23

## (undated) RX ORDER — LIDOCAINE HYDROCHLORIDE AND EPINEPHRINE 10; 10 MG/ML; UG/ML
INJECTION, SOLUTION INFILTRATION; PERINEURAL
Status: DISPENSED
Start: 2023-07-22

## (undated) RX ORDER — BUPIVACAINE HYDROCHLORIDE 5 MG/ML
INJECTION, SOLUTION EPIDURAL; INTRACAUDAL
Status: DISPENSED
Start: 2023-07-22

## (undated) RX ORDER — LIDOCAINE HYDROCHLORIDE 10 MG/ML
INJECTION, SOLUTION EPIDURAL; INFILTRATION; INTRACAUDAL; PERINEURAL
Status: DISPENSED
Start: 2023-07-22

## (undated) RX ORDER — FENTANYL CITRATE-0.9 % NACL/PF 10 MCG/ML
PLASTIC BAG, INJECTION (ML) INTRAVENOUS
Status: DISPENSED
Start: 2023-07-22

## (undated) RX ORDER — FENTANYL CITRATE 50 UG/ML
INJECTION, SOLUTION INTRAMUSCULAR; INTRAVENOUS
Status: DISPENSED
Start: 2023-07-22

## (undated) RX ORDER — CEFAZOLIN SODIUM/WATER 2 G/20 ML
SYRINGE (ML) INTRAVENOUS
Status: DISPENSED
Start: 2023-07-22

## (undated) RX ORDER — LIDOCAINE HYDROCHLORIDE 10 MG/ML
INJECTION, SOLUTION EPIDURAL; INFILTRATION; INTRACAUDAL; PERINEURAL
Status: DISPENSED
Start: 2023-07-23

## (undated) RX ORDER — CEFAZOLIN SODIUM/WATER 2 G/20 ML
SYRINGE (ML) INTRAVENOUS
Status: DISPENSED
Start: 2023-07-23

## (undated) RX ORDER — PROPOFOL 10 MG/ML
INJECTION, EMULSION INTRAVENOUS
Status: DISPENSED
Start: 2023-07-23

## (undated) RX ORDER — LIDOCAINE HYDROCHLORIDE 10 MG/ML
INJECTION, SOLUTION EPIDURAL; INFILTRATION; INTRACAUDAL; PERINEURAL
Status: DISPENSED
Start: 2023-07-24

## (undated) RX ORDER — FENTANYL CITRATE-0.9 % NACL/PF 10 MCG/ML
PLASTIC BAG, INJECTION (ML) INTRAVENOUS
Status: DISPENSED
Start: 2023-07-24

## (undated) RX ORDER — PROPOFOL 10 MG/ML
INJECTION, EMULSION INTRAVENOUS
Status: DISPENSED
Start: 2023-07-22

## (undated) RX ORDER — DEXAMETHASONE SODIUM PHOSPHATE 4 MG/ML
INJECTION, SOLUTION INTRA-ARTICULAR; INTRALESIONAL; INTRAMUSCULAR; INTRAVENOUS; SOFT TISSUE
Status: DISPENSED
Start: 2023-07-22